# Patient Record
Sex: FEMALE | Race: WHITE | NOT HISPANIC OR LATINO | Employment: PART TIME | URBAN - METROPOLITAN AREA
[De-identification: names, ages, dates, MRNs, and addresses within clinical notes are randomized per-mention and may not be internally consistent; named-entity substitution may affect disease eponyms.]

---

## 2023-10-13 ENCOUNTER — OFFICE VISIT (OUTPATIENT)
Dept: FAMILY MEDICINE CLINIC | Facility: CLINIC | Age: 23
End: 2023-10-13
Payer: OTHER GOVERNMENT

## 2023-10-13 VITALS
WEIGHT: 148 LBS | DIASTOLIC BLOOD PRESSURE: 78 MMHG | HEART RATE: 77 BPM | BODY MASS INDEX: 27.23 KG/M2 | SYSTOLIC BLOOD PRESSURE: 118 MMHG | HEIGHT: 62 IN | OXYGEN SATURATION: 99 %

## 2023-10-13 DIAGNOSIS — Z11.59 ENCOUNTER FOR HEPATITIS C SCREENING TEST FOR LOW RISK PATIENT: ICD-10-CM

## 2023-10-13 DIAGNOSIS — Z00.00 ANNUAL PHYSICAL EXAM: Primary | ICD-10-CM

## 2023-10-13 DIAGNOSIS — Z23 NEED FOR INFLUENZA VACCINATION: ICD-10-CM

## 2023-10-13 DIAGNOSIS — Z13.220 LIPID SCREENING: ICD-10-CM

## 2023-10-13 DIAGNOSIS — F41.9 ANXIETY: ICD-10-CM

## 2023-10-13 PROCEDURE — 99385 PREV VISIT NEW AGE 18-39: CPT | Performed by: NURSE PRACTITIONER

## 2023-10-13 RX ORDER — NORGESTREL AND ETHINYL ESTRADIOL 0.3-0.03MG
1 KIT ORAL DAILY
COMMUNITY
Start: 2023-08-21

## 2023-10-13 RX ORDER — BUSPIRONE HYDROCHLORIDE 5 MG/1
5 TABLET ORAL 3 TIMES DAILY
Qty: 90 TABLET | Refills: 5 | Status: SHIPPED | OUTPATIENT
Start: 2023-10-13

## 2023-10-13 NOTE — PROGRESS NOTES
"Chief Complaint  Establish Care and Anxiety    Subjective        Miesha Bro presents to [facility] PRIMARY CARE  History of Present Illness  Miesha is experiencing anxiety.  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    Objective   Vital Signs:  /78   Pulse 77   Ht 157.5 cm (62\")   Wt 67.1 kg (148 lb)   SpO2 99%   BMI 27.07 kg/mý   Estimated body mass index is 27.07 kg/mý as calculated from the following:    Height as of this encounter: 157.5 cm (62\").    Weight as of this encounter: 67.1 kg (148 lb).       BMI is >= 25 and <30. (Overweight) The following options were offered after discussion;: weight loss educational material (shared in after visit summary), exercise counseling/recommendations, and nutrition counseling/recommendations      Physical Exam  Constitutional:       Appearance: She is normal weight.   HENT:      Head: Normocephalic and atraumatic.      Mouth/Throat:      Mouth: Mucous membranes are moist.   Eyes:      Extraocular Movements: Extraocular movements intact.      Pupils: Pupils are equal, round, and reactive to light.   Cardiovascular:      Rate and Rhythm: Normal rate and regular rhythm.      Pulses: Normal pulses.      Heart sounds: Normal heart sounds.   Pulmonary:      Effort: Pulmonary effort is normal.      Breath sounds: Normal breath sounds.   Abdominal:      General: Abdomen is flat. Bowel sounds are normal.      Palpations: Abdomen is soft.   Musculoskeletal:         General: Normal range of motion.      Cervical back: Normal range of motion.   Skin:     General: Skin is warm and dry.      Capillary Refill: Capillary refill takes less than 2 seconds.   Neurological:      General: No focal deficit present.      Mental Status: She is alert.      Comments: CN II-XII grossly " intact. No scoliosis on exam.  Miesha can heel walk, toe walk, heel-toe walk, squat and duck walk without difficulty.  Upper and lower pulses are 2+, strength is equal bilaterally both upper and lower extremities.   Psychiatric:         Mood and Affect: Mood normal.                         Assessment and Plan   There are no diagnoses linked to this encounter.         Follow Up   No follow-ups on file.  Patient was given instructions and counseling regarding her condition or for health maintenance advice. Please see specific information pulled into the AVS if appropriate.

## 2023-10-14 LAB
ALBUMIN SERPL-MCNC: 4.6 G/DL (ref 4–5)
ALBUMIN/GLOB SERPL: 1.6 {RATIO} (ref 1.2–2.2)
ALP SERPL-CCNC: 70 IU/L (ref 44–121)
ALT SERPL-CCNC: 13 IU/L (ref 0–32)
AST SERPL-CCNC: 16 IU/L (ref 0–40)
BASOPHILS # BLD AUTO: 0.1 X10E3/UL (ref 0–0.2)
BASOPHILS NFR BLD AUTO: 1 %
BILIRUB SERPL-MCNC: <0.2 MG/DL (ref 0–1.2)
BUN SERPL-MCNC: 17 MG/DL (ref 6–20)
BUN/CREAT SERPL: 24 (ref 9–23)
CALCIUM SERPL-MCNC: 9.8 MG/DL (ref 8.7–10.2)
CHLORIDE SERPL-SCNC: 102 MMOL/L (ref 96–106)
CHOLEST SERPL-MCNC: 248 MG/DL (ref 100–199)
CO2 SERPL-SCNC: 22 MMOL/L (ref 20–29)
CREAT SERPL-MCNC: 0.72 MG/DL (ref 0.57–1)
EGFRCR SERPLBLD CKD-EPI 2021: 122 ML/MIN/1.73
EOSINOPHIL # BLD AUTO: 0.1 X10E3/UL (ref 0–0.4)
EOSINOPHIL NFR BLD AUTO: 2 %
ERYTHROCYTE [DISTWIDTH] IN BLOOD BY AUTOMATED COUNT: 14.1 % (ref 11.7–15.4)
GLOBULIN SER CALC-MCNC: 2.8 G/DL (ref 1.5–4.5)
GLUCOSE SERPL-MCNC: 81 MG/DL (ref 70–99)
HCT VFR BLD AUTO: 42.2 % (ref 34–46.6)
HCV AB SERPL QL IA: NORMAL
HCV IGG SERPL QL IA: NON REACTIVE
HDLC SERPL-MCNC: 44 MG/DL
HGB BLD-MCNC: 12.9 G/DL (ref 11.1–15.9)
IMM GRANULOCYTES # BLD AUTO: 0 X10E3/UL (ref 0–0.1)
IMM GRANULOCYTES NFR BLD AUTO: 0 %
LDLC SERPL CALC-MCNC: 184 MG/DL (ref 0–99)
LYMPHOCYTES # BLD AUTO: 1.6 X10E3/UL (ref 0.7–3.1)
LYMPHOCYTES NFR BLD AUTO: 21 %
MCH RBC QN AUTO: 25.7 PG (ref 26.6–33)
MCHC RBC AUTO-ENTMCNC: 30.6 G/DL (ref 31.5–35.7)
MCV RBC AUTO: 84 FL (ref 79–97)
MONOCYTES # BLD AUTO: 0.5 X10E3/UL (ref 0.1–0.9)
MONOCYTES NFR BLD AUTO: 7 %
NEUTROPHILS # BLD AUTO: 5.3 X10E3/UL (ref 1.4–7)
NEUTROPHILS NFR BLD AUTO: 69 %
PLATELET # BLD AUTO: 303 X10E3/UL (ref 150–450)
POTASSIUM SERPL-SCNC: 4.7 MMOL/L (ref 3.5–5.2)
PROT SERPL-MCNC: 7.4 G/DL (ref 6–8.5)
RBC # BLD AUTO: 5.01 X10E6/UL (ref 3.77–5.28)
SODIUM SERPL-SCNC: 139 MMOL/L (ref 134–144)
TRIGL SERPL-MCNC: 111 MG/DL (ref 0–149)
VLDLC SERPL CALC-MCNC: 20 MG/DL (ref 5–40)
WBC # BLD AUTO: 7.7 X10E3/UL (ref 3.4–10.8)

## 2023-10-14 NOTE — PROGRESS NOTES
Miesha,    Your CBC looks fine  Your kidney and liver function look fine  Your Bun to Creatinine ratio was a little high - you need a little hydration  Your Total cholesterol and LDL cholesterol are high - Eating a healthy, low fat diet, increasing your intake of fresh fruits and vegetables, limiting beef to once a week, and adding more fish (baked preferrably) along with exercising 45 minutes 5 days a week will help improve these numbers.  You have not been exposed to Hepatits C.  Overall, work on your diet, exercise and drinking enough fluids to remain hydrated.  We will see you at your next visit.

## 2024-04-01 LAB
AFP INTERP SERPL-IMP: NORMAL
EXTERNAL NIPT: NORMAL

## 2024-06-03 ENCOUNTER — TELEPHONE (OUTPATIENT)
Dept: FAMILY MEDICINE CLINIC | Facility: CLINIC | Age: 24
End: 2024-06-03
Payer: OTHER GOVERNMENT

## 2024-06-03 NOTE — TELEPHONE ENCOUNTER
Caller: Adali Miesha    Relationship: Self    Best call back number: [phone]    What is the best time to reach you: ANYTIME     Who are you requesting to speak with (clinical staff, provider,  specific staff member): CLINICAL     What was the call regarding: PATIENT STATES WITH HER  INSURANCE, SHE IS NEEDING A REFERRAL FOR GLOBAL MATERNITY SO HER OBGYN CAN COVER EVERYTHING.     PATIENT STATES SHE GOES TO [facility] ON [address] .     PATIENT IS REQUESTING A CALL BACK.

## 2024-06-05 ENCOUNTER — TELEPHONE (OUTPATIENT)
Dept: FAMILY MEDICINE CLINIC | Facility: CLINIC | Age: 24
End: 2024-06-05
Payer: OTHER GOVERNMENT

## 2024-06-05 DIAGNOSIS — Z3A.01 LESS THAN 8 WEEKS GESTATION OF PREGNANCY: Primary | ICD-10-CM

## 2024-06-05 NOTE — TELEPHONE ENCOUNTER
Caller: Miesha Bro    Relationship: Self    Best call back number: [phone]     What is the best time to reach you: ANY    Who are you requesting to speak with (clinical staff, provider,  specific staff member): ANY    What was the call regarding: PATIENT IS REQUESTING A STATUS UPDATE ON REFERRAL FOR GLOBAL MATERNITY     Is it okay if the provider responds through MyChart: NO

## 2024-06-05 NOTE — TELEPHONE ENCOUNTER
Per Charity,    We have sent a message to our  to see what needs to be done here. Waiting to hear back from her.

## 2024-06-06 NOTE — TELEPHONE ENCOUNTER
[phone] Faxed to [facility] for referral auth requested by patient     Once approved/response received patient will be called to advise

## 2024-07-24 ENCOUNTER — INITIAL PRENATAL (OUTPATIENT)
Dept: OBSTETRICS AND GYNECOLOGY | Age: 24
End: 2024-07-24
Payer: OTHER GOVERNMENT

## 2024-07-24 VITALS — SYSTOLIC BLOOD PRESSURE: 118 MMHG | BODY MASS INDEX: 30.29 KG/M2 | WEIGHT: 165.6 LBS | DIASTOLIC BLOOD PRESSURE: 74 MMHG

## 2024-07-24 DIAGNOSIS — Z34.82 ENCOUNTER FOR SUPERVISION OF OTHER NORMAL PREGNANCY IN SECOND TRIMESTER: Primary | ICD-10-CM

## 2024-07-24 DIAGNOSIS — Z13.0 SCREENING FOR IRON DEFICIENCY ANEMIA: ICD-10-CM

## 2024-07-24 DIAGNOSIS — Z13.1 SCREENING FOR DIABETES MELLITUS: ICD-10-CM

## 2024-07-24 DIAGNOSIS — Z13.89 SCREENING FOR BLOOD OR PROTEIN IN URINE: ICD-10-CM

## 2024-07-24 PROBLEM — Z34.90 SUPERVISION OF NORMAL PREGNANCY: Status: ACTIVE | Noted: 2024-07-24

## 2024-07-24 LAB
GLUCOSE UR STRIP-MCNC: NEGATIVE MG/DL
PROT UR STRIP-MCNC: NEGATIVE MG/DL

## 2024-07-24 RX ORDER — PRENATAL VIT/IRON FUM/FOLIC AC 27MG-0.8MG
TABLET ORAL DAILY
COMMUNITY

## 2024-07-24 RX ORDER — ONDANSETRON 4 MG/1
TABLET, ORALLY DISINTEGRATING ORAL
COMMUNITY
Start: 2024-04-29

## 2024-07-24 NOTE — PROGRESS NOTES
Miesha Bro, a 23 y.o.  at 28w0d, presents for OB transfer, [facility] not [facility] her [facility]..  She is doing well today.  Denies loss of fluid, vaginal bleeding, and contractions.  Endorses fetal movements.  She has had history of 1 HSV outbreak and none further.  Her  Myron is in the [employer] and currently stationed in [place].  He does plan to be able to travel for her delivery and stay for about a month.  She is interested in induction of labor to better coordinate his travel.  She is accompanied by her mom today    Past Medical History:   Diagnosis Date    Herpes      Past Surgical History:   Procedure Laterality Date    WISDOM TOOTH EXTRACTION       Family History   Problem Relation Age of Onset    Hypertension Father     Coronary artery disease Maternal Grandfather     Deep vein thrombosis Maternal Aunt     Congenital heart disease Neg Hx        Objective  BP Readings from Last 3 Encounters:   24 118/74   10/13/23 118/78      Wt Readings from Last 3 Encounters:   24 75.1 kg (165 lb 9.6 oz)   10/13/23 67.1 kg (148 lb)      Total weight gain this pregnancy: 4.808 kg (10 lb 9.6 oz)    General: Awake, alert, no apparent distress  Respiratory: No increased work of breathing  Abdomen: Fundus soft and nontender  Extremity: Nontender, no edema    A/P  Diagnoses and all orders for this visit:    1. Encounter for supervision of other normal pregnancy in second trimester (Primary)  -     Treponema pallidum AB w/Reflex RPR    2. Screening for iron deficiency anemia  -     CBC & Differential    3. Screening for diabetes mellitus  -     Gestational Screen 1 Hr ([facility])    4. Screening for blood or protein in urine  -     POC Urinalysis Dipstick    Other orders  -     Tdap Vaccine Greater Than or Equal To 6yo IM      [facility]  Pregnancy, classes, pain mgmt counseling  GCT, tdap CBC and varicella today    I spent 45 minutes caring for  Miesha Bro on this date of service. This time includes time spent by me in the following activities: preparing for the visit, reviewing tests, obtaining and/or reviewing a separately obtained history, performing a medically appropriate examination and/or evaluation, counseling and educating the patient/family/caregiver, ordering medications, tests, or procedures and documenting information in the medical record.  This time does NOT include time spent on separately reported services.       Labor precautions reviewed  Return for every 2 wks til 36 then weekly. growth US at 34 weeks please.    Paige Bernard MD

## 2024-07-26 LAB
BASOPHILS # BLD AUTO: 0 X10E3/UL (ref 0–0.2)
BASOPHILS NFR BLD AUTO: 0 %
EOSINOPHIL # BLD AUTO: 0 X10E3/UL (ref 0–0.4)
EOSINOPHIL NFR BLD AUTO: 0 %
ERYTHROCYTE [DISTWIDTH] IN BLOOD BY AUTOMATED COUNT: 12.2 % (ref 11.7–15.4)
GLUCOSE 1H P 50 G GLC PO SERPL-MCNC: 94 MG/DL (ref 70–139)
HCT VFR BLD AUTO: 34.3 % (ref 34–46.6)
HGB BLD-MCNC: 10.9 G/DL (ref 11.1–15.9)
IMM GRANULOCYTES # BLD AUTO: 0.1 X10E3/UL (ref 0–0.1)
IMM GRANULOCYTES NFR BLD AUTO: 1 %
LYMPHOCYTES # BLD AUTO: 1.1 X10E3/UL (ref 0.7–3.1)
LYMPHOCYTES NFR BLD AUTO: 10 %
MCH RBC QN AUTO: 27.3 PG (ref 26.6–33)
MCHC RBC AUTO-ENTMCNC: 31.8 G/DL (ref 31.5–35.7)
MCV RBC AUTO: 86 FL (ref 79–97)
MONOCYTES # BLD AUTO: 0.5 X10E3/UL (ref 0.1–0.9)
MONOCYTES NFR BLD AUTO: 4 %
NEUTROPHILS # BLD AUTO: 9.9 X10E3/UL (ref 1.4–7)
NEUTROPHILS NFR BLD AUTO: 85 %
PLATELET # BLD AUTO: 232 X10E3/UL (ref 150–450)
RBC # BLD AUTO: 4 X10E6/UL (ref 3.77–5.28)
TREPONEMA PALLIDUM IGG+IGM AB [PRESENCE] IN SERUM OR PLASMA BY IMMUNOASSAY: NON REACTIVE
WBC # BLD AUTO: 11.7 X10E3/UL (ref 3.4–10.8)

## 2024-08-07 ENCOUNTER — ROUTINE PRENATAL (OUTPATIENT)
Dept: OBSTETRICS AND GYNECOLOGY | Age: 24
End: 2024-08-07
Payer: OTHER GOVERNMENT

## 2024-08-07 VITALS — SYSTOLIC BLOOD PRESSURE: 124 MMHG | BODY MASS INDEX: 31.31 KG/M2 | WEIGHT: 171.2 LBS | DIASTOLIC BLOOD PRESSURE: 76 MMHG

## 2024-08-07 DIAGNOSIS — Z34.03 ENCOUNTER FOR SUPERVISION OF NORMAL FIRST PREGNANCY IN THIRD TRIMESTER: ICD-10-CM

## 2024-08-07 DIAGNOSIS — Z13.89 SCREENING FOR BLOOD OR PROTEIN IN URINE: Primary | ICD-10-CM

## 2024-08-07 LAB
GLUCOSE UR STRIP-MCNC: NEGATIVE MG/DL
PROT UR STRIP-MCNC: NEGATIVE MG/DL

## 2024-08-07 NOTE — PROGRESS NOTES
Miesha Bro, a 24 y.o.  at 30w0d, presents for OB follow-up.  She is doing well today.  Denies loss of fluid, vaginal bleeding, and contractions.  Endorses fetal movements.    Objective  BP Readings from Last 3 Encounters:   24 124/76   24 118/74   10/13/23 118/78      Wt Readings from Last 3 Encounters:   24 77.7 kg (171 lb 3.2 oz)   24 75.1 kg (165 lb 9.6 oz)   10/13/23 67.1 kg (148 lb)      Total weight gain this pregnancy: 7.348 kg (16 lb 3.2 oz)    General: Awake, alert, no apparent distress  Respiratory: No increased work of breathing  Abdomen: Fundus soft and nontender  Extremity: Nontender, no edema    A/P  Diagnoses and all orders for this visit:    1. Screening for blood or protein in urine (Primary)  -     POC Urinalysis Dipstick    2. Encounter for supervision of normal first pregnancy in third trimester    Mild anemia, PNV doesn't contain iron and advised to start iron  Labor precautions reviewed  FU 2 wks    Paige Bernard MD

## 2024-08-21 ENCOUNTER — ROUTINE PRENATAL (OUTPATIENT)
Dept: OBSTETRICS AND GYNECOLOGY | Age: 24
End: 2024-08-21
Payer: OTHER GOVERNMENT

## 2024-08-21 VITALS — BODY MASS INDEX: 31.72 KG/M2 | SYSTOLIC BLOOD PRESSURE: 120 MMHG | DIASTOLIC BLOOD PRESSURE: 74 MMHG | WEIGHT: 173.4 LBS

## 2024-08-21 DIAGNOSIS — Z34.03 ENCOUNTER FOR SUPERVISION OF NORMAL FIRST PREGNANCY IN THIRD TRIMESTER: ICD-10-CM

## 2024-08-21 DIAGNOSIS — Z13.89 SCREENING FOR BLOOD OR PROTEIN IN URINE: Primary | ICD-10-CM

## 2024-08-21 NOTE — PROGRESS NOTES
Miesha Bro, a 24 y.o.  at 32w0d, presents for OB follow-up.  She is doing well today.  Denies loss of fluid, vaginal bleeding, and contractions.  Endorses fetal movements.    Objective  BP Readings from Last 3 Encounters:   24 120/74   24 124/76   24 118/74      Wt Readings from Last 3 Encounters:   24 78.7 kg (173 lb 6.4 oz)   24 77.7 kg (171 lb 3.2 oz)   24 75.1 kg (165 lb 9.6 oz)      Total weight gain this pregnancy: 8.346 kg (18 lb 6.4 oz)    General: Awake, alert, no apparent distress  Respiratory: No increased work of breathing  Abdomen: Fundus soft and nontender  Extremity: Nontender, no edema    A/P  Diagnoses and all orders for this visit:    1. Screening for blood or protein in urine (Primary)  -     POC Urinalysis Dipstick    2. Encounter for supervision of normal first pregnancy in third trimester    Other orders  -     ABRYSVO Vaccine (RSV for Pregnant Women 32-36 wks)        Labor precautions reviewed  FU 2 wks    Paige Bernard MD

## 2024-09-04 ENCOUNTER — ROUTINE PRENATAL (OUTPATIENT)
Dept: OBSTETRICS AND GYNECOLOGY | Age: 24
End: 2024-09-04
Payer: OTHER GOVERNMENT

## 2024-09-04 VITALS — WEIGHT: 176.2 LBS | DIASTOLIC BLOOD PRESSURE: 76 MMHG | SYSTOLIC BLOOD PRESSURE: 118 MMHG | BODY MASS INDEX: 32.23 KG/M2

## 2024-09-04 DIAGNOSIS — Z13.89 SCREENING FOR BLOOD OR PROTEIN IN URINE: Primary | ICD-10-CM

## 2024-09-04 DIAGNOSIS — Z34.03 ENCOUNTER FOR SUPERVISION OF NORMAL FIRST PREGNANCY IN THIRD TRIMESTER: ICD-10-CM

## 2024-09-04 LAB
GLUCOSE UR STRIP-MCNC: NEGATIVE MG/DL
PROT UR STRIP-MCNC: NEGATIVE MG/DL

## 2024-09-04 NOTE — PROGRESS NOTES
Miesha Bro, a 24 y.o.  at 34w0d, presents for OB follow-up.  She is doing well today.  Denies loss of fluid, vaginal bleeding. She has been having some intermittent contractions it sounds like. Discussed PTL precautions.  Endorses fetal movements.    Objective  BP Readings from Last 3 Encounters:   24 118/76   24 120/74   24 124/76      Wt Readings from Last 3 Encounters:   24 79.9 kg (176 lb 3.2 oz)   24 78.7 kg (173 lb 6.4 oz)   24 77.7 kg (171 lb 3.2 oz)      Total weight gain this pregnancy: 9.616 kg (21 lb 3.2 oz)    General: Awake, alert, no apparent distress  Respiratory: No increased work of breathing  Abdomen: Fundus soft and nontender  Extremity: Nontender, no edema    A/P  Diagnoses and all orders for this visit:    1. Screening for blood or protein in urine (Primary)  -     POC Urinalysis Dipstick    2. Encounter for supervision of normal first pregnancy in third trimester      No, average fetal growth  Vertex presentation    Follow-up in 2 weeks with GBS  Labor precautions reviewed      Paige Bernard MD

## 2024-09-19 ENCOUNTER — ROUTINE PRENATAL (OUTPATIENT)
Dept: OBSTETRICS AND GYNECOLOGY | Age: 24
End: 2024-09-19
Payer: OTHER GOVERNMENT

## 2024-09-19 VITALS — DIASTOLIC BLOOD PRESSURE: 70 MMHG | SYSTOLIC BLOOD PRESSURE: 120 MMHG | WEIGHT: 179 LBS | BODY MASS INDEX: 32.74 KG/M2

## 2024-09-19 DIAGNOSIS — B00.9 HERPES VIRUS INFECTION IN MOTHER DURING THIRD TRIMESTER OF PREGNANCY: ICD-10-CM

## 2024-09-19 DIAGNOSIS — Z36.85 ANTENATAL SCREENING FOR STREPTOCOCCUS B: ICD-10-CM

## 2024-09-19 DIAGNOSIS — Z13.89 SCREENING FOR BLOOD OR PROTEIN IN URINE: Primary | ICD-10-CM

## 2024-09-19 DIAGNOSIS — O98.513 HERPES VIRUS INFECTION IN MOTHER DURING THIRD TRIMESTER OF PREGNANCY: ICD-10-CM

## 2024-09-19 LAB
GLUCOSE UR STRIP-MCNC: NEGATIVE MG/DL
PROT UR STRIP-MCNC: NEGATIVE MG/DL

## 2024-09-19 RX ORDER — VALACYCLOVIR HYDROCHLORIDE 500 MG/1
500 TABLET, FILM COATED ORAL 2 TIMES DAILY
Qty: 60 TABLET | Refills: 1 | Status: SHIPPED | OUTPATIENT
Start: 2024-09-19

## 2024-09-23 LAB — B-HEM STREP SPEC QL CULT: NEGATIVE

## 2024-09-25 ENCOUNTER — ROUTINE PRENATAL (OUTPATIENT)
Dept: OBSTETRICS AND GYNECOLOGY | Age: 24
End: 2024-09-25
Payer: OTHER GOVERNMENT

## 2024-09-25 VITALS — WEIGHT: 181 LBS | BODY MASS INDEX: 33.11 KG/M2 | DIASTOLIC BLOOD PRESSURE: 70 MMHG | SYSTOLIC BLOOD PRESSURE: 118 MMHG

## 2024-09-25 DIAGNOSIS — Z34.03 ENCOUNTER FOR SUPERVISION OF NORMAL FIRST PREGNANCY IN THIRD TRIMESTER: ICD-10-CM

## 2024-09-25 DIAGNOSIS — Z13.89 SCREENING FOR BLOOD OR PROTEIN IN URINE: Primary | ICD-10-CM

## 2024-09-25 LAB
GLUCOSE UR STRIP-MCNC: NEGATIVE MG/DL
PROT UR STRIP-MCNC: NEGATIVE MG/DL

## 2024-09-25 PROCEDURE — 0502F SUBSEQUENT PRENATAL CARE: CPT | Performed by: OBSTETRICS & GYNECOLOGY

## 2024-10-02 ENCOUNTER — ROUTINE PRENATAL (OUTPATIENT)
Dept: OBSTETRICS AND GYNECOLOGY | Age: 24
End: 2024-10-02
Payer: OTHER GOVERNMENT

## 2024-10-02 ENCOUNTER — PREP FOR SURGERY (OUTPATIENT)
Dept: OTHER | Facility: HOSPITAL | Age: 24
End: 2024-10-02
Payer: OTHER GOVERNMENT

## 2024-10-02 VITALS — WEIGHT: 180.8 LBS | BODY MASS INDEX: 33.07 KG/M2 | SYSTOLIC BLOOD PRESSURE: 128 MMHG | DIASTOLIC BLOOD PRESSURE: 80 MMHG

## 2024-10-02 DIAGNOSIS — Z34.03 ENCOUNTER FOR SUPERVISION OF NORMAL FIRST PREGNANCY IN THIRD TRIMESTER: ICD-10-CM

## 2024-10-02 DIAGNOSIS — Z13.89 SCREENING FOR BLOOD OR PROTEIN IN URINE: Primary | ICD-10-CM

## 2024-10-02 LAB
GLUCOSE UR STRIP-MCNC: NEGATIVE MG/DL
PROT UR STRIP-MCNC: ABNORMAL MG/DL

## 2024-10-02 RX ORDER — LIDOCAINE HYDROCHLORIDE 10 MG/ML
0.5 INJECTION, SOLUTION INFILTRATION; PERINEURAL ONCE AS NEEDED
OUTPATIENT
Start: 2024-10-02

## 2024-10-02 RX ORDER — TERBUTALINE SULFATE 1 MG/ML
0.25 INJECTION, SOLUTION SUBCUTANEOUS AS NEEDED
OUTPATIENT
Start: 2024-10-02

## 2024-10-02 RX ORDER — ACETAMINOPHEN 325 MG/1
650 TABLET ORAL EVERY 4 HOURS PRN
OUTPATIENT
Start: 2024-10-02

## 2024-10-02 RX ORDER — ONDANSETRON 4 MG/1
4 TABLET, ORALLY DISINTEGRATING ORAL EVERY 6 HOURS PRN
OUTPATIENT
Start: 2024-10-02

## 2024-10-02 RX ORDER — ONDANSETRON 2 MG/ML
4 INJECTION INTRAMUSCULAR; INTRAVENOUS EVERY 6 HOURS PRN
OUTPATIENT
Start: 2024-10-02

## 2024-10-02 RX ORDER — SODIUM CHLORIDE 9 MG/ML
40 INJECTION, SOLUTION INTRAVENOUS AS NEEDED
OUTPATIENT
Start: 2024-10-02

## 2024-10-02 RX ORDER — FAMOTIDINE 20 MG/1
20 TABLET, FILM COATED ORAL 2 TIMES DAILY PRN
OUTPATIENT
Start: 2024-10-02

## 2024-10-02 RX ORDER — SODIUM CHLORIDE 0.9 % (FLUSH) 0.9 %
10 SYRINGE (ML) INJECTION EVERY 12 HOURS SCHEDULED
OUTPATIENT
Start: 2024-10-02

## 2024-10-02 RX ORDER — SODIUM CHLORIDE, SODIUM LACTATE, POTASSIUM CHLORIDE, CALCIUM CHLORIDE 600; 310; 30; 20 MG/100ML; MG/100ML; MG/100ML; MG/100ML
125 INJECTION, SOLUTION INTRAVENOUS CONTINUOUS
OUTPATIENT
Start: 2024-10-02

## 2024-10-02 RX ORDER — CARBOPROST TROMETHAMINE 250 UG/ML
250 INJECTION, SOLUTION INTRAMUSCULAR
OUTPATIENT
Start: 2024-10-02

## 2024-10-02 RX ORDER — FAMOTIDINE 10 MG/ML
20 INJECTION, SOLUTION INTRAVENOUS 2 TIMES DAILY PRN
OUTPATIENT
Start: 2024-10-02

## 2024-10-02 RX ORDER — MISOPROSTOL 200 UG/1
800 TABLET ORAL ONCE AS NEEDED
OUTPATIENT
Start: 2024-10-02

## 2024-10-02 RX ORDER — TRANEXAMIC ACID 10 MG/ML
1000 INJECTION, SOLUTION INTRAVENOUS ONCE AS NEEDED
OUTPATIENT
Start: 2024-10-02

## 2024-10-02 RX ORDER — SODIUM CHLORIDE 0.9 % (FLUSH) 0.9 %
10 SYRINGE (ML) INJECTION AS NEEDED
OUTPATIENT
Start: 2024-10-02

## 2024-10-02 RX ORDER — METHYLERGONOVINE MALEATE 0.2 MG/ML
200 INJECTION INTRAVENOUS ONCE AS NEEDED
OUTPATIENT
Start: 2024-10-02

## 2024-10-02 RX ORDER — OXYTOCIN/0.9 % SODIUM CHLORIDE 30/500 ML
999 PLASTIC BAG, INJECTION (ML) INTRAVENOUS ONCE
OUTPATIENT
Start: 2024-10-02 | End: 2024-10-02

## 2024-10-02 RX ORDER — OXYTOCIN/0.9 % SODIUM CHLORIDE 30/500 ML
250 PLASTIC BAG, INJECTION (ML) INTRAVENOUS CONTINUOUS
OUTPATIENT
Start: 2024-10-02 | End: 2024-10-02

## 2024-10-02 RX ORDER — MAGNESIUM CARB/ALUMINUM HYDROX 105-160MG
30 TABLET,CHEWABLE ORAL ONCE AS NEEDED
OUTPATIENT
Start: 2024-10-02

## 2024-10-02 NOTE — PROGRESS NOTES
Miesha Bro, a 24 y.o.  at 38w0d, presents for OB follow-up.  She is doing well today.  Denies loss of fluid, vaginal bleeding, and contractions.  Endorses fetal movements.    Objective  BP Readings from Last 3 Encounters:   10/02/24 128/80   24 118/70   24 120/70      Wt Readings from Last 3 Encounters:   10/02/24 82 kg (180 lb 12.8 oz)   24 82.1 kg (181 lb)   24 81.2 kg (179 lb)      Total weight gain this pregnancy: 11.7 kg (25 lb 12.8 oz)    General: Awake, alert, no apparent distress  Respiratory: No increased work of breathing  Abdomen: Fundus soft and nontender  Extremity: Nontender, no edema    A/P  Diagnoses and all orders for this visit:    1. Screening for blood or protein in urine (Primary)  -     POC Urinalysis Dipstick    2. Encounter for supervision of normal first pregnancy in third trimester  -     Labor Induction      Desires term IOL, scheduled today    Paige Bernard MD

## 2024-10-03 ENCOUNTER — PREP FOR SURGERY (OUTPATIENT)
Dept: OTHER | Facility: HOSPITAL | Age: 24
End: 2024-10-03
Payer: OTHER GOVERNMENT

## 2024-10-03 RX ORDER — MISOPROSTOL 100 MCG
25 TABLET ORAL EVERY 4 HOURS PRN
OUTPATIENT
Start: 2024-10-03

## 2024-10-10 ENCOUNTER — ANESTHESIA EVENT (OUTPATIENT)
Dept: LABOR AND DELIVERY | Facility: HOSPITAL | Age: 24
End: 2024-10-10
Payer: OTHER GOVERNMENT

## 2024-10-10 ENCOUNTER — HOSPITAL ENCOUNTER (OUTPATIENT)
Dept: LABOR AND DELIVERY | Facility: HOSPITAL | Age: 24
Discharge: HOME | End: 2024-10-10
Payer: OTHER GOVERNMENT

## 2024-10-10 ENCOUNTER — ANESTHESIA (OUTPATIENT)
Dept: LABOR AND DELIVERY | Facility: HOSPITAL | Age: 24
End: 2024-10-10
Payer: OTHER GOVERNMENT

## 2024-10-10 ENCOUNTER — HOSPITAL ENCOUNTER (INPATIENT)
Facility: HOSPITAL | Age: 24
LOS: 3 days | Discharge: HOME | End: 2024-10-13
Attending: OBSTETRICS & GYNECOLOGY | Admitting: OBSTETRICS & GYNECOLOGY
Payer: OTHER GOVERNMENT

## 2024-10-10 PROBLEM — Z37.9 VACUUM-ASSISTED VAGINAL DELIVERY: Status: ACTIVE | Noted: 2024-10-10

## 2024-10-10 PROBLEM — O14.13 SEVERE PRE-ECLAMPSIA IN THIRD TRIMESTER: Status: ACTIVE | Noted: 2024-10-10

## 2024-10-10 LAB
ABO GROUP BLD: NORMAL
ALBUMIN SERPL-MCNC: 3.4 G/DL (ref 3.5–5.2)
ALBUMIN/GLOB SERPL: 1.3 G/DL
ALP SERPL-CCNC: 90 U/L (ref 39–117)
ALT SERPL W P-5'-P-CCNC: 17 U/L (ref 1–33)
ANION GAP SERPL CALCULATED.3IONS-SCNC: 10.9 MMOL/L (ref 5–15)
AST SERPL-CCNC: 20 U/L (ref 1–32)
ATMOSPHERIC PRESS: 753.1 MMHG
ATMOSPHERIC PRESS: 753.5 MMHG
BASE EXCESS BLDCOA CALC-SCNC: -5.7 MMOL/L (ref -2–2)
BASE EXCESS BLDCOV CALC-SCNC: -4.5 MMOL/L (ref -30–30)
BASOPHILS # BLD AUTO: 0.05 10*3/MM3 (ref 0–0.2)
BASOPHILS NFR BLD AUTO: 0.5 % (ref 0–1.5)
BDY SITE: ABNORMAL
BDY SITE: NORMAL
BILIRUB SERPL-MCNC: <0.2 MG/DL (ref 0–1.2)
BLD GP AB SCN SERPL QL: NEGATIVE
BUN SERPL-MCNC: 10 MG/DL (ref 6–20)
BUN/CREAT SERPL: 20.8 (ref 7–25)
CALCIUM SPEC-SCNC: 8.8 MG/DL (ref 8.6–10.5)
CHLORIDE SERPL-SCNC: 105 MMOL/L (ref 98–107)
CO2 BLDA-SCNC: 23.8 MMOL/L (ref 23–27)
CO2 BLDA-SCNC: 24.4 MMOL/L (ref 23–27)
CO2 SERPL-SCNC: 20.1 MMOL/L (ref 22–29)
CREAT SERPL-MCNC: 0.48 MG/DL (ref 0.57–1)
DEPRECATED RDW RBC AUTO: 53.7 FL (ref 37–54)
DEVICE COMMENT: ABNORMAL
DEVICE COMMENT: NORMAL
EGFRCR SERPLBLD CKD-EPI 2021: 137.5 ML/MIN/1.73
EOSINOPHIL # BLD AUTO: 0.09 10*3/MM3 (ref 0–0.4)
EOSINOPHIL NFR BLD AUTO: 0.8 % (ref 0.3–6.2)
ERYTHROCYTE [DISTWIDTH] IN BLOOD BY AUTOMATED COUNT: 17.9 % (ref 12.3–15.4)
GLOBULIN UR ELPH-MCNC: 2.7 GM/DL
GLUCOSE SERPL-MCNC: 74 MG/DL (ref 65–99)
HCO3 BLDCOA-SCNC: 22.7 MMOL/L (ref 22–28)
HCO3 BLDCOV-SCNC: 22.4 MMOL/L
HCT VFR BLD AUTO: 35.5 % (ref 34–46.6)
HGB BLD-MCNC: 11.4 G/DL (ref 12–15.9)
IMM GRANULOCYTES # BLD AUTO: 0.05 10*3/MM3 (ref 0–0.05)
IMM GRANULOCYTES NFR BLD AUTO: 0.5 % (ref 0–0.5)
LYMPHOCYTES # BLD AUTO: 1.7 10*3/MM3 (ref 0.7–3.1)
LYMPHOCYTES NFR BLD AUTO: 15.9 % (ref 19.6–45.3)
MCH RBC QN AUTO: 26.7 PG (ref 26.6–33)
MCHC RBC AUTO-ENTMCNC: 32.1 G/DL (ref 31.5–35.7)
MCV RBC AUTO: 83.1 FL (ref 79–97)
MODALITY: ABNORMAL
MODALITY: NORMAL
MONOCYTES # BLD AUTO: 0.79 10*3/MM3 (ref 0.1–0.9)
MONOCYTES NFR BLD AUTO: 7.4 % (ref 5–12)
NEUTROPHILS NFR BLD AUTO: 74.9 % (ref 42.7–76)
NEUTROPHILS NFR BLD AUTO: 8.01 10*3/MM3 (ref 1.7–7)
PCO2 BLDCOA: 56.5 MMHG (ref 43–63)
PCO2 BLDCOV: 47.6 MM HG (ref 35–51.3)
PH BLDCOA: 7.21 PH UNITS (ref 7.18–7.34)
PH BLDCOV: 7.28 PH UNITS (ref 7.26–7.4)
PLATELET # BLD AUTO: 181 10*3/MM3 (ref 140–450)
PMV BLD AUTO: 12 FL (ref 6–12)
PO2 BLDCOA: <22.1 MMHG (ref 12–26)
PO2 BLDCOV: 21.6 MM HG (ref 19–39)
POTASSIUM SERPL-SCNC: 3.7 MMOL/L (ref 3.5–5.2)
PROT SERPL-MCNC: 6.1 G/DL (ref 6–8.5)
RBC # BLD AUTO: 4.27 10*6/MM3 (ref 3.77–5.28)
RH BLD: POSITIVE
SAO2 % BLDCOA: 21.1 %
SAO2 % BLDCOV: NORMAL %
SODIUM SERPL-SCNC: 136 MMOL/L (ref 136–145)
T&S EXPIRATION DATE: NORMAL
TREPONEMA PALLIDUM IGG+IGM AB [PRESENCE] IN SERUM OR PLASMA BY IMMUNOASSAY: NORMAL
WBC NRBC COR # BLD AUTO: 10.69 10*3/MM3 (ref 3.4–10.8)

## 2024-10-10 PROCEDURE — 25010000002 LABETALOL 5 MG/ML SOLUTION: Performed by: OBSTETRICS & GYNECOLOGY

## 2024-10-10 PROCEDURE — 25010000002 MAGNESIUM SULFATE 20 GM/500ML SOLUTION: Performed by: OBSTETRICS & GYNECOLOGY

## 2024-10-10 PROCEDURE — 85025 COMPLETE CBC W/AUTO DIFF WBC: CPT | Performed by: OBSTETRICS & GYNECOLOGY

## 2024-10-10 PROCEDURE — 86850 RBC ANTIBODY SCREEN: CPT | Performed by: OBSTETRICS & GYNECOLOGY

## 2024-10-10 PROCEDURE — 25810000003 LACTATED RINGERS PER 1000 ML: Performed by: OBSTETRICS & GYNECOLOGY

## 2024-10-10 PROCEDURE — 12042 INTMD RPR N-HF/GENIT2.6-7.5: CPT | Performed by: OBSTETRICS & GYNECOLOGY

## 2024-10-10 PROCEDURE — 80053 COMPREHEN METABOLIC PANEL: CPT | Performed by: OBSTETRICS & GYNECOLOGY

## 2024-10-10 PROCEDURE — 25010000002 ROPIVACAINE PER 1 MG: Performed by: STUDENT IN AN ORGANIZED HEALTH CARE EDUCATION/TRAINING PROGRAM

## 2024-10-10 PROCEDURE — 3E033VJ INTRODUCTION OF OTHER HORMONE INTO PERIPHERAL VEIN, PERCUTANEOUS APPROACH: ICD-10-PCS | Performed by: OBSTETRICS & GYNECOLOGY

## 2024-10-10 PROCEDURE — 82803 BLOOD GASES ANY COMBINATION: CPT

## 2024-10-10 PROCEDURE — 86900 BLOOD TYPING SEROLOGIC ABO: CPT | Performed by: OBSTETRICS & GYNECOLOGY

## 2024-10-10 PROCEDURE — C1755 CATHETER, INTRASPINAL: HCPCS | Performed by: STUDENT IN AN ORGANIZED HEALTH CARE EDUCATION/TRAINING PROGRAM

## 2024-10-10 PROCEDURE — 86780 TREPONEMA PALLIDUM: CPT | Performed by: OBSTETRICS & GYNECOLOGY

## 2024-10-10 PROCEDURE — 59400 OBSTETRICAL CARE: CPT | Performed by: OBSTETRICS & GYNECOLOGY

## 2024-10-10 PROCEDURE — 86901 BLOOD TYPING SEROLOGIC RH(D): CPT | Performed by: OBSTETRICS & GYNECOLOGY

## 2024-10-10 RX ORDER — ERYTHROMYCIN 5 MG/G
OINTMENT OPHTHALMIC
Status: ACTIVE
Start: 2024-10-10 | End: 2024-10-10

## 2024-10-10 RX ORDER — FAMOTIDINE 20 MG/1
20 TABLET, FILM COATED ORAL 2 TIMES DAILY PRN
Status: DISCONTINUED | OUTPATIENT
Start: 2024-10-10 | End: 2024-10-10

## 2024-10-10 RX ORDER — FAMOTIDINE 10 MG/ML
20 INJECTION, SOLUTION INTRAVENOUS 2 TIMES DAILY PRN
Status: DISCONTINUED | OUTPATIENT
Start: 2024-10-10 | End: 2024-10-10

## 2024-10-10 RX ORDER — FENTANYL/ROPIVACAINE/NS/PF 2MCG/ML-.2
10 PLASTIC BAG, INJECTION (ML) INJECTION CONTINUOUS
Status: DISCONTINUED | OUTPATIENT
Start: 2024-10-10 | End: 2024-10-10

## 2024-10-10 RX ORDER — SODIUM CHLORIDE 0.9 % (FLUSH) 0.9 %
1-10 SYRINGE (ML) INJECTION AS NEEDED
Status: DISCONTINUED | OUTPATIENT
Start: 2024-10-10 | End: 2024-10-13 | Stop reason: HOSPADM

## 2024-10-10 RX ORDER — OXYTOCIN/0.9 % SODIUM CHLORIDE 30/500 ML
999 PLASTIC BAG, INJECTION (ML) INTRAVENOUS ONCE
Status: COMPLETED | OUTPATIENT
Start: 2024-10-10 | End: 2024-10-10

## 2024-10-10 RX ORDER — PRENATAL VIT/IRON FUM/FOLIC AC 27MG-0.8MG
1 TABLET ORAL DAILY
Status: DISCONTINUED | OUTPATIENT
Start: 2024-10-10 | End: 2024-10-13 | Stop reason: HOSPADM

## 2024-10-10 RX ORDER — SODIUM CHLORIDE, SODIUM LACTATE, POTASSIUM CHLORIDE, CALCIUM CHLORIDE 600; 310; 30; 20 MG/100ML; MG/100ML; MG/100ML; MG/100ML
125 INJECTION, SOLUTION INTRAVENOUS CONTINUOUS
Status: DISCONTINUED | OUTPATIENT
Start: 2024-10-10 | End: 2024-10-10

## 2024-10-10 RX ORDER — BUPIVACAINE HYDROCHLORIDE 2.5 MG/ML
INJECTION, SOLUTION EPIDURAL; INFILTRATION; INTRACAUDAL
Status: ACTIVE
Start: 2024-10-10 | End: 2024-10-10

## 2024-10-10 RX ORDER — ACETAMINOPHEN 325 MG/1
650 TABLET ORAL EVERY 6 HOURS PRN
Status: DISCONTINUED | OUTPATIENT
Start: 2024-10-10 | End: 2024-10-13 | Stop reason: HOSPADM

## 2024-10-10 RX ORDER — ONDANSETRON 2 MG/ML
4 INJECTION INTRAMUSCULAR; INTRAVENOUS ONCE AS NEEDED
Status: DISCONTINUED | OUTPATIENT
Start: 2024-10-10 | End: 2024-10-10

## 2024-10-10 RX ORDER — ONDANSETRON 2 MG/ML
4 INJECTION INTRAMUSCULAR; INTRAVENOUS EVERY 6 HOURS PRN
Status: DISCONTINUED | OUTPATIENT
Start: 2024-10-10 | End: 2024-10-10

## 2024-10-10 RX ORDER — ROPIVACAINE HYDROCHLORIDE 2 MG/ML
INJECTION, SOLUTION EPIDURAL; INFILTRATION; PERINEURAL AS NEEDED
Status: DISCONTINUED | OUTPATIENT
Start: 2024-10-10 | End: 2024-10-10 | Stop reason: SURG

## 2024-10-10 RX ORDER — SODIUM CHLORIDE 0.9 % (FLUSH) 0.9 %
10 SYRINGE (ML) INJECTION EVERY 12 HOURS SCHEDULED
Status: DISCONTINUED | OUTPATIENT
Start: 2024-10-10 | End: 2024-10-10

## 2024-10-10 RX ORDER — HYDROCODONE BITARTRATE AND ACETAMINOPHEN 5; 325 MG/1; MG/1
1 TABLET ORAL EVERY 4 HOURS PRN
Status: DISCONTINUED | OUTPATIENT
Start: 2024-10-10 | End: 2024-10-13 | Stop reason: HOSPADM

## 2024-10-10 RX ORDER — MISOPROSTOL 100 MCG
25 TABLET ORAL EVERY 4 HOURS PRN
Status: DISCONTINUED | OUTPATIENT
Start: 2024-10-10 | End: 2024-10-10

## 2024-10-10 RX ORDER — HYDROCORTISONE 25 MG/G
1 CREAM TOPICAL AS NEEDED
Status: DISCONTINUED | OUTPATIENT
Start: 2024-10-10 | End: 2024-10-13 | Stop reason: HOSPADM

## 2024-10-10 RX ORDER — SODIUM CHLORIDE, SODIUM LACTATE, POTASSIUM CHLORIDE, CALCIUM CHLORIDE 600; 310; 30; 20 MG/100ML; MG/100ML; MG/100ML; MG/100ML
75 INJECTION, SOLUTION INTRAVENOUS CONTINUOUS
Status: ACTIVE | OUTPATIENT
Start: 2024-10-10 | End: 2024-10-12

## 2024-10-10 RX ORDER — DIPHENHYDRAMINE HYDROCHLORIDE 50 MG/ML
12.5 INJECTION INTRAMUSCULAR; INTRAVENOUS EVERY 8 HOURS PRN
Status: DISCONTINUED | OUTPATIENT
Start: 2024-10-10 | End: 2024-10-10

## 2024-10-10 RX ORDER — SODIUM CHLORIDE, SODIUM LACTATE, POTASSIUM CHLORIDE, CALCIUM CHLORIDE 600; 310; 30; 20 MG/100ML; MG/100ML; MG/100ML; MG/100ML
75 INJECTION, SOLUTION INTRAVENOUS CONTINUOUS
Status: ACTIVE | OUTPATIENT
Start: 2024-10-10 | End: 2024-10-10

## 2024-10-10 RX ORDER — DIPHENHYDRAMINE HCL 25 MG
25 CAPSULE ORAL NIGHTLY PRN
Status: DISCONTINUED | OUTPATIENT
Start: 2024-10-10 | End: 2024-10-13 | Stop reason: HOSPADM

## 2024-10-10 RX ORDER — ACETAMINOPHEN 325 MG/1
650 TABLET ORAL EVERY 4 HOURS PRN
Status: DISCONTINUED | OUTPATIENT
Start: 2024-10-10 | End: 2024-10-10

## 2024-10-10 RX ORDER — OXYTOCIN/0.9 % SODIUM CHLORIDE 30/500 ML
250 PLASTIC BAG, INJECTION (ML) INTRAVENOUS CONTINUOUS
Status: DISPENSED | OUTPATIENT
Start: 2024-10-10 | End: 2024-10-10

## 2024-10-10 RX ORDER — FERROUS SULFATE 325(65) MG
325 TABLET ORAL
COMMUNITY

## 2024-10-10 RX ORDER — HYDROCODONE BITARTRATE AND ACETAMINOPHEN 10; 325 MG/1; MG/1
1 TABLET ORAL EVERY 4 HOURS PRN
Status: DISCONTINUED | OUTPATIENT
Start: 2024-10-10 | End: 2024-10-13 | Stop reason: HOSPADM

## 2024-10-10 RX ORDER — CARBOPROST TROMETHAMINE 250 UG/ML
250 INJECTION, SOLUTION INTRAMUSCULAR
Status: DISCONTINUED | OUTPATIENT
Start: 2024-10-10 | End: 2024-10-10

## 2024-10-10 RX ORDER — PHYTONADIONE 1 MG/.5ML
INJECTION, EMULSION INTRAMUSCULAR; INTRAVENOUS; SUBCUTANEOUS
Status: ACTIVE
Start: 2024-10-10 | End: 2024-10-10

## 2024-10-10 RX ORDER — HYDRALAZINE HYDROCHLORIDE 20 MG/ML
5-10 INJECTION INTRAMUSCULAR; INTRAVENOUS
Status: DISCONTINUED | OUTPATIENT
Start: 2024-10-10 | End: 2024-10-10

## 2024-10-10 RX ORDER — LIDOCAINE HYDROCHLORIDE 10 MG/ML
0.5 INJECTION, SOLUTION INFILTRATION; PERINEURAL ONCE AS NEEDED
Status: DISCONTINUED | OUTPATIENT
Start: 2024-10-10 | End: 2024-10-10

## 2024-10-10 RX ORDER — LABETALOL HYDROCHLORIDE 5 MG/ML
20-80 INJECTION, SOLUTION INTRAVENOUS
Status: DISCONTINUED | OUTPATIENT
Start: 2024-10-10 | End: 2024-10-10

## 2024-10-10 RX ORDER — ONDANSETRON 4 MG/1
4 TABLET, ORALLY DISINTEGRATING ORAL EVERY 8 HOURS PRN
Status: DISCONTINUED | OUTPATIENT
Start: 2024-10-10 | End: 2024-10-13 | Stop reason: HOSPADM

## 2024-10-10 RX ORDER — TRANEXAMIC ACID 10 MG/ML
1000 INJECTION, SOLUTION INTRAVENOUS ONCE AS NEEDED
Status: DISCONTINUED | OUTPATIENT
Start: 2024-10-10 | End: 2024-10-10

## 2024-10-10 RX ORDER — FAMOTIDINE 10 MG/ML
20 INJECTION, SOLUTION INTRAVENOUS ONCE AS NEEDED
Status: DISCONTINUED | OUTPATIENT
Start: 2024-10-10 | End: 2024-10-10

## 2024-10-10 RX ORDER — EPHEDRINE SULFATE 50 MG/ML
5 INJECTION, SOLUTION INTRAVENOUS
Status: DISCONTINUED | OUTPATIENT
Start: 2024-10-10 | End: 2024-10-10

## 2024-10-10 RX ORDER — SODIUM CHLORIDE 0.9 % (FLUSH) 0.9 %
10 SYRINGE (ML) INJECTION AS NEEDED
Status: DISCONTINUED | OUTPATIENT
Start: 2024-10-10 | End: 2024-10-10

## 2024-10-10 RX ORDER — PETROLATUM 420 MG/G
1 OINTMENT TOPICAL EVERY 12 HOURS SCHEDULED
Status: DISCONTINUED | OUTPATIENT
Start: 2024-10-10 | End: 2024-10-13 | Stop reason: HOSPADM

## 2024-10-10 RX ORDER — MAGNESIUM SULFATE HEPTAHYDRATE 40 MG/ML
2 INJECTION, SOLUTION INTRAVENOUS CONTINUOUS
Status: DISPENSED | OUTPATIENT
Start: 2024-10-10 | End: 2024-10-13

## 2024-10-10 RX ORDER — DOCUSATE SODIUM 100 MG/1
100 CAPSULE, LIQUID FILLED ORAL 2 TIMES DAILY
Status: DISCONTINUED | OUTPATIENT
Start: 2024-10-10 | End: 2024-10-13 | Stop reason: HOSPADM

## 2024-10-10 RX ORDER — METHYLERGONOVINE MALEATE 0.2 MG/ML
200 INJECTION INTRAVENOUS ONCE AS NEEDED
Status: DISCONTINUED | OUTPATIENT
Start: 2024-10-10 | End: 2024-10-10

## 2024-10-10 RX ORDER — SODIUM CHLORIDE, SODIUM LACTATE, POTASSIUM CHLORIDE, CALCIUM CHLORIDE 600; 310; 30; 20 MG/100ML; MG/100ML; MG/100ML; MG/100ML
75 INJECTION, SOLUTION INTRAVENOUS CONTINUOUS
Status: DISCONTINUED | OUTPATIENT
Start: 2024-10-10 | End: 2024-10-10

## 2024-10-10 RX ORDER — ONDANSETRON 4 MG/1
4 TABLET, ORALLY DISINTEGRATING ORAL EVERY 6 HOURS PRN
Status: DISCONTINUED | OUTPATIENT
Start: 2024-10-10 | End: 2024-10-10

## 2024-10-10 RX ORDER — OXYTOCIN/0.9 % SODIUM CHLORIDE 30/500 ML
2-20 PLASTIC BAG, INJECTION (ML) INTRAVENOUS
Status: DISCONTINUED | OUTPATIENT
Start: 2024-10-10 | End: 2024-10-10

## 2024-10-10 RX ORDER — CALCIUM CARBONATE 500 MG/1
2 TABLET, CHEWABLE ORAL 3 TIMES DAILY PRN
Status: DISCONTINUED | OUTPATIENT
Start: 2024-10-10 | End: 2024-10-13 | Stop reason: HOSPADM

## 2024-10-10 RX ORDER — SODIUM CHLORIDE 9 MG/ML
40 INJECTION, SOLUTION INTRAVENOUS AS NEEDED
Status: DISCONTINUED | OUTPATIENT
Start: 2024-10-10 | End: 2024-10-10

## 2024-10-10 RX ORDER — MAGNESIUM CARB/ALUMINUM HYDROX 105-160MG
30 TABLET,CHEWABLE ORAL ONCE AS NEEDED
Status: DISCONTINUED | OUTPATIENT
Start: 2024-10-10 | End: 2024-10-10

## 2024-10-10 RX ORDER — NIFEDIPINE 10 MG/1
10-20 CAPSULE ORAL
Status: DISCONTINUED | OUTPATIENT
Start: 2024-10-10 | End: 2024-10-10

## 2024-10-10 RX ORDER — IBUPROFEN 600 MG/1
600 TABLET, FILM COATED ORAL EVERY 6 HOURS PRN
Status: DISCONTINUED | OUTPATIENT
Start: 2024-10-10 | End: 2024-10-13 | Stop reason: HOSPADM

## 2024-10-10 RX ORDER — MISOPROSTOL 200 UG/1
800 TABLET ORAL ONCE AS NEEDED
Status: DISCONTINUED | OUTPATIENT
Start: 2024-10-10 | End: 2024-10-10

## 2024-10-10 RX ORDER — OXYTOCIN/0.9 % SODIUM CHLORIDE 30/500 ML
125 PLASTIC BAG, INJECTION (ML) INTRAVENOUS ONCE AS NEEDED
Status: DISCONTINUED | OUTPATIENT
Start: 2024-10-10 | End: 2024-10-13 | Stop reason: HOSPADM

## 2024-10-10 RX ORDER — TERBUTALINE SULFATE 1 MG/ML
0.25 INJECTION, SOLUTION SUBCUTANEOUS AS NEEDED
Status: DISCONTINUED | OUTPATIENT
Start: 2024-10-10 | End: 2024-10-10

## 2024-10-10 RX ORDER — BISACODYL 10 MG
10 SUPPOSITORY, RECTAL RECTAL DAILY PRN
Status: DISCONTINUED | OUTPATIENT
Start: 2024-10-11 | End: 2024-10-13 | Stop reason: HOSPADM

## 2024-10-10 RX ADMIN — Medication 8 ML/HR: at 07:33

## 2024-10-10 RX ADMIN — SODIUM CHLORIDE, POTASSIUM CHLORIDE, SODIUM LACTATE AND CALCIUM CHLORIDE 125 ML/HR: 600; 310; 30; 20 INJECTION, SOLUTION INTRAVENOUS at 06:29

## 2024-10-10 RX ADMIN — IBUPROFEN 600 MG: 600 TABLET, FILM COATED ORAL at 14:26

## 2024-10-10 RX ADMIN — ROPIVACAINE HYDROCHLORIDE 6 ML: 2 INJECTION, SOLUTION EPIDURAL; INFILTRATION at 07:33

## 2024-10-10 RX ADMIN — IBUPROFEN 600 MG: 600 TABLET, FILM COATED ORAL at 20:34

## 2024-10-10 RX ADMIN — ACETAMINOPHEN 325MG 650 MG: 325 TABLET ORAL at 18:34

## 2024-10-10 RX ADMIN — Medication 2 MILLI-UNITS/MIN: at 06:29

## 2024-10-10 RX ADMIN — Medication 999 ML/HR: at 11:18

## 2024-10-10 RX ADMIN — SODIUM CHLORIDE, POTASSIUM CHLORIDE, SODIUM LACTATE AND CALCIUM CHLORIDE 50 ML/HR: 600; 310; 30; 20 INJECTION, SOLUTION INTRAVENOUS at 08:48

## 2024-10-10 RX ADMIN — HYDROCODONE BITARTRATE AND ACETAMINOPHEN 1 TABLET: 10; 325 TABLET ORAL at 14:47

## 2024-10-10 RX ADMIN — Medication 25 MCG: at 02:37

## 2024-10-10 RX ADMIN — SODIUM CHLORIDE, POTASSIUM CHLORIDE, SODIUM LACTATE AND CALCIUM CHLORIDE 75 ML/HR: 600; 310; 30; 20 INJECTION, SOLUTION INTRAVENOUS at 22:39

## 2024-10-10 RX ADMIN — LABETALOL HYDROCHLORIDE 20 MG: 5 INJECTION, SOLUTION INTRAVENOUS at 04:16

## 2024-10-10 RX ADMIN — LABETALOL HYDROCHLORIDE 20 MG: 5 INJECTION, SOLUTION INTRAVENOUS at 08:37

## 2024-10-10 RX ADMIN — SODIUM CHLORIDE, POTASSIUM CHLORIDE, SODIUM LACTATE AND CALCIUM CHLORIDE 125 ML/HR: 600; 310; 30; 20 INJECTION, SOLUTION INTRAVENOUS at 01:38

## 2024-10-10 RX ADMIN — MAGNESIUM SULFATE HEPTAHYDRATE 2 G/HR: 40 INJECTION, SOLUTION INTRAVENOUS at 16:53

## 2024-10-10 NOTE — PLAN OF CARE
Goal Outcome Evaluation:      , 39.1 weeks, elective induction, no significant history, blood pressures elevated on arrival, blood pressure protocol ordered, will continue to monitor blood pressures, pitocin ordered to start at 0630.

## 2024-10-10 NOTE — ANESTHESIA PROCEDURE NOTES
Labor Epidural    Pre-sedation assessment completed: 10/10/2024 7:17 AM    Patient reassessed immediately prior to procedure    Patient location during procedure: OB  Start Time: 10/10/2024 7:17 AM  Stop Time: 10/10/2024 7:32 AM  Indication:at surgeon's request  Performed By  Anesthesiologist: Kalen Patel MD  Preanesthetic Checklist  Completed: patient identified, risks and benefits discussed and timeout performed  Prep:  Pt Position:sitting  Sterile Tech:cap, gloves, mask and sterile barrier  Prep:chlorhexidine gluconate and isopropyl alcohol  Monitoring:blood pressure monitoring, continuous pulse oximetry and EKG  Epidural Block Procedure:  Approach:midline  Guidance:landmark technique and palpation technique  Location:L3-L4  Needle Type:Tuohy  Needle Gauge:17 G  Loss of Resistance Medium: saline  Loss of Resistance: 8cm  Cath Depth at skin:14 cm  Paresthesia: none  Aspiration:negative  Test Dose:negative  Number of Attempts: 2  Post Assessment:  Dressing:occlusive dressing applied and secured with tape  Pt Tolerance:patient tolerated the procedure well with no apparent complications  Complications:no

## 2024-10-10 NOTE — PLAN OF CARE
Problem: Adult Inpatient Plan of Care  Goal: Plan of Care Review  Outcome: Adequate for Care Transition  Flowsheets (Taken 10/10/2024 1212)  Progress: improving  Plan of Care Reviewed With:   spouse   patient   family  Outcome Evaluation: IOL, 1 dose of cytotec, started on pitocin, AROM at 0901, complete dilation at 1100, viable baby boy born at 1115, VSS, Bleeding WDL, will continue to monitor.  Goal: Patient-Specific Goal (Individualized)  Outcome: Adequate for Care Transition  Flowsheets (Taken 10/10/2024 1212)  Patient-Specific Goals (Include Timeframe): Stable BPs and healthy mom/baby at discharge.  Individualized Care Needs: control BPs  Anxieties, Fears or Concerns: Fears associated with magnesium and first time baby.  Goal: Absence of Hospital-Acquired Illness or Injury  Outcome: Adequate for Care Transition  Intervention: Identify and Manage Fall Risk  Recent Flowsheet Documentation  Taken 10/10/2024 1145 by Juan Francisco Bernal RN  Safety Promotion/Fall Prevention: safety round/check completed  Goal: Optimal Comfort and Wellbeing  Outcome: Adequate for Care Transition  Intervention: Provide Person-Centered Care  Recent Flowsheet Documentation  Taken 10/10/2024 1145 by Juan Francisco Bernal RN  Trust Relationship/Rapport:   care explained   emotional support provided   choices provided   empathic listening provided   questions encouraged   questions answered   reassurance provided   thoughts/feelings acknowledged  Goal: Readiness for Transition of Care  Outcome: Adequate for Care Transition     Problem: Bleeding (Labor)  Goal: Hemostasis  Outcome: Adequate for Care Transition     Problem: Change in Fetal Wellbeing (Labor)  Goal: Stable Fetal Wellbeing  Outcome: Adequate for Care Transition     Problem: Delayed Labor Progression (Labor)  Goal: Effective Progression to Delivery  Outcome: Adequate for Care Transition     Problem: Infection (Labor)  Goal: Absence of Infection Signs and Symptoms  Outcome: Adequate for  Care Transition     Problem: Labor Pain (Labor)  Goal: Acceptable Pain Control  Outcome: Adequate for Care Transition     Problem: Uterine Tachysystole (Labor)  Goal: Normal Uterine Contraction Pattern  Outcome: Adequate for Care Transition     Problem: Pain Acute  Goal: Acceptable Pain Control and Functional Ability  Outcome: Adequate for Care Transition     Problem: Fall Injury Risk  Goal: Absence of Fall and Fall-Related Injury  Outcome: Adequate for Care Transition  Intervention: Promote Injury-Free Environment  Recent Flowsheet Documentation  Taken 10/10/2024 1145 by Juan Francisco Bernal RN  Safety Promotion/Fall Prevention: safety round/check completed     Problem: Hypertensive Disorders in Pregnancy  Goal: Maternal-Fetal Stabilization  Outcome: Adequate for Care Transition     Problem: Skin Injury Risk Increased  Goal: Skin Health and Integrity  Outcome: Adequate for Care Transition   Goal Outcome Evaluation:  Plan of Care Reviewed With: spouse, patient, family        Progress: improving  Outcome Evaluation: IOL, 1 dose of cytotec, started on pitocin, AROM at 0901, complete dilation at 1100, viable baby boy born at 1115, VSS, Bleeding WDL, will continue to monitor.

## 2024-10-10 NOTE — ANESTHESIA POSTPROCEDURE EVALUATION
Patient: Miesha Bro    Procedure Summary       Date: 10/10/24 Room / Location:     Anesthesia Start: 0717 Anesthesia Stop: 1115    Procedure: LABOR ANALGESIA Diagnosis:     Scheduled Providers:  Provider: Kalen Patel MD    Anesthesia Type: epidural ASA Status: 2            Anesthesia Type: epidural    Vitals  Vitals Value Taken Time   /89 10/10/24 1415   Temp 36.6 °C (97.9 °F) 10/10/24 0931   Pulse 76 10/10/24 1424   Resp 16 10/10/24 1315   SpO2 99 % 10/10/24 1424   Vitals shown include unfiled device data.        Post Anesthesia Care and Evaluation      Comments: Anesthesia present throughout labor and delivery

## 2024-10-10 NOTE — ANESTHESIA PREPROCEDURE EVALUATION
Anesthesia Evaluation     Patient summary reviewed and Nursing notes reviewed   no history of anesthetic complications:   NPO Solid Status: > 8 hours  NPO Liquid Status: > 2 hours           Airway   Mallampati: II  TM distance: >3 FB  Neck ROM: full  Dental      Pulmonary    Cardiovascular         Neuro/Psych  GI/Hepatic/Renal/Endo    (+) obesity    Musculoskeletal     Abdominal    Substance History      OB/GYN    (+) Pregnant        Other                    Anesthesia Plan    ASA 2     epidural     (39w1d)    Anesthetic plan, risks, benefits, and alternatives have been provided, discussed and informed consent has been obtained with: patient.    CODE STATUS:    Level Of Support Discussed With: Patient  Code Status (Patient has no pulse and is not breathing): CPR (Attempt to Resuscitate)  Medical Interventions (Patient has pulse or is breathing): Full Support

## 2024-10-10 NOTE — H&P
Psychiatric  Obstetric History and Physical    Chief Complaint   Patient presents with    Scheduled Induction       Subjective     Patient is a 24 y.o. female  currently at 39w1d, who presents for term elective induction of labor.  She received Cytotec overnight and then was started on Pitocin.  She had very painful contractions and received an epidural.  Her blood pressure has been elevated at times overnight and she received IV medication for treatment.  She denies any headache, visual changes.      The following portions of the patients history were reviewed and updated as appropriate: current medications, allergies, past medical history, past surgical history, problem list, and ob hx  .       Prenatal Information:       Prenatal Labs for [facility] Patients  Lab Results   Component Value Date    HGB 11.4 (L) 10/10/2024    HEPBSAG Negative 2024    ABSCRN Negative 10/10/2024    [id number] Non-Reactive 2024    HEPCVIRUSABY Non-reactive 2024       Prenatal Labs -- transcribed from paper records by Nurse  Lab Results   Component Value Date    ABO A 10/10/2024    RH Positive 10/10/2024    HEPBSAG Negative 2024    RPR Non-Reactive 2024    [id number] Non-Reactive 2024           Past OB History:       OB History    Para Term  AB Living   1 0 0 0 0 0   SAB IAB Ectopic Molar Multiple Live Births   0 0 0 0 0 0      # Outcome Date GA Lbr Jack/2nd Weight Sex Type Anes PTL Lv   1 Current                Past Medical History: Past Medical History:   Diagnosis Date    Herpes     Severe pre-eclampsia in third trimester 10/10/2024      Past Surgical History Past Surgical History:   Procedure Laterality Date    WISDOM TOOTH EXTRACTION        Family History: Family History   Problem Relation Age of Onset    Hypertension Father     Coronary artery disease Maternal Grandfather     Deep vein thrombosis Maternal Aunt     Congenital heart disease Neg Hx       Social  History:  reports that she has never smoked. She does not have any smokeless tobacco history on file.   reports that she does not currently use alcohol after a past usage of about 1.0 standard drink of alcohol per week.   reports no history of drug use.           Objective       Vital Signs Range for the last 24 hours  Temperature: Temp:  [97.9 °F (36.6 °C)-98.2 °F (36.8 °C)] 97.9 °F (36.6 °C)   Temp Source: Temp src: Oral   BP: BP: (104-180)/() 114/71   Pulse: Heart Rate:  [53-92] 82   Respirations: Resp:  [16] 16   SPO2: SpO2:  [99 %-100 %] 100 %   O2 Amount (l/min):     O2 Devices     Weight: Weight:  [83 kg (182 lb 15.7 oz)-83 kg (183 lb)] 83 kg (182 lb 15.7 oz)     Physical Examination: General appearance - alert, well appearing, and in no distress  Chest - no tachypnea, retractions or cyanosis  Abdomen - fundus soft between ctx, ctx palpate strong, EFW approx 7 lb 8 oz  Pelvic - adequate pelvimetry  Extremities - no pedal edema noted  Skin - normal coloration and turgor, no rashes, no suspicious skin lesions noted    Presentation: vertex   Cervix: Exam by: Method: sterile exam per physician   Dilation: Cervical Dilation (cm): 6   Effacement: Cervical Effacement: 90%   Station: Fetal Station: -1       Fetal Heart Rate Assessment   Method: Fetal HR Assessment Method: external   Beats/min: Fetal HR (beats/min): 125   Baseline: Fetal HR Baseline: normal range   Varibility: Fetal HR Variability: moderate (amplitude range 6 to 25 bpm) (period of minimal)   Accels: Fetal HR Accelerations: greater than/equal to 15 bpm, lasting at least 15 seconds   Decels: Fetal HR Decelerations: absent   Tracing Category:       Uterine Assessment   Method: Method: external tocotransducer   Frequency (min): Contraction Frequency (Minutes): 2-3   Ctx Count in 10 min:     Duration:     Intensity: Contraction Intensity: moderate by palpation   Intensity by [facility]:     Resting Tone: Uterine Resting Tone: soft by palpation   Resting  Tone by [facility]:     [place] Units:       Results from last 7 days   Lab Units 10/10/24  0146   WBC 10*3/mm3 10.69   HEMOGLOBIN g/dL 11.4*   HEMATOCRIT % 35.5   PLATELETS 10*3/mm3 181       Assessment & Plan       Supervision of normal pregnancy    Severe pre-eclampsia in third trimester        Assessment:  1.  Intrauterine pregnancy at 39w1d weeks gestation with reactive fetal status.    2.  Induction of labor, preeclampsia with severe features  3.  Obstetrical history benign  4.  GBS status: negative      Plan:  1. AROM performed w clear fluid. Continue pitocin. Discussed preeclampsia diagnosis with her and her family. Given need to treat for severe range BP plan to start magnesium for seizure prophylaxis.  Her labs were normal upon admission, plan to repeat tomorrow  2. Plan of care has been reviewed with patient and her familu  3.  Risks, benefits of treatment plan have been discussed.  4.  All questions have been answered.        Paige Bernard MD  10/10/2024  09:44 EDT

## 2024-10-10 NOTE — L&D DELIVERY NOTE
[facility]   Vaginal Delivery Note    Patient Name: Miesha Bro  : 2000  MRN: [id number]    Date of Delivery: 10/10/2024     Diagnosis     Pre & Post-Delivery:  Intrauterine pregnancy at 39w1d  Labor status: Induced Onset of Labor     Supervision of normal pregnancy    Severe pre-eclampsia in third trimester    Type 3a perineal laceration    Vacuum-assisted vaginal delivery             Problem List    Transfer to Postpartum     Review the Delivery Report for details.     Delivery     Delivery: Vaginal, Vacuum (Extractor)     YOB: 2022    Time of Birth:  Gestational Age 11:15 AM   39w1d     Anesthesia: Epidural     Delivering clinician: Gisel Amador    Forceps?   No   Vacuum? Yes  Vacuum Delivery  Vacuum attempted? No     Vacuum indication:     Vacuum type: Kiwi Pro (bell)    Application location:     First Attempt     Time applied:     Time removed:     Second Attempt    Time applied:     Time removed:     Third Attempt    Time applied:     Time removed:     Number of pulls: 1    Number of pop-offs: 0    Low-end pressure range:     High-end pressure range:     Total application time:     Applied by: GISEL AMADOR MD    Failed? No        Shoulder dystocia present: No        Delivery narrative:  Miesha Bro was admitted for term elective induction of labor.  She received Cytotec and then was started on Pitocin.  She received an epidural for pain control.  During her labor course, she was noted to have severe range blood pressures and received antihypertensive medications.  She was also started on magnesium for seizure prophylaxis.  She entered an active phase of labor and became complete and +2 station.  When pushing commenced, her baby began having deep variable and at times prolonged decelerations to a kunal of 60 bpm.  The fetal station was +2, the scalp was visible at the introitus.  As she was making only a small amount of progress with pushing and there was concern for  fetal wellbeing, I discussed with her using a vacuum to assist in delivery.  A small right mediolateral episiotomy was performed as the perineum was quite tight.  One more attempt at pushing was made with the episiotomy and delivery was still not affected and so the vacuum was placed in proper position and ensuring that there is no trapped maternal tissue.  The suction was applied to the green zone and with one push and gentle downward outward traction the head delivered over a supported perineum in left occiput anterior position.  The shoulders and body delivered readily.  The vigorous  was handed to mom for skin to skin.  Delayed cord clamping was performed.  Cord gases were obtained.  The placenta delivered readily with gentle traction.  A uterine sweep confirmed no retained products.  A type III a perineal laceration was noted.  There is only a small disruption of the sphincter capsule and a small amount of muscle fiber.  This was reapproximated in an end-to-end fashion using 2-0 Vicryl in interrupted sutures.  The remainder of the laceration was repaired in 2-0 Vicryl in usual fashion.  A thorough rectal examination was performed and there was no defect of the mucosa or sutures in the rectal mucosa.  Mother and baby were doing well at the conclusion of the delivery.      Infant     Findings: male  infant     Infant observations: Weight: No birth weight on file.   Length:   in  Observations/Comments:        Apgars: 8  @ 1 minute /    9  @ 5 minutes   Infant Name: Andrey Smallwood     Placenta & Cord         Placenta delivered  Spontaneous  at   10/10/2024 11:18 AM     Cord: Unknown  present.   Nuchal Cord?  no   Cord blood obtained: Yes    Cord gases obtained:  Yes    Cord gas results: Venous:    pH, Cord Venous   Date Value Ref Range Status   10/10/2024 7.280 7.260 - 7.400 pH Units Final     Comment:     Serial Number: [id number]:  [id number]     Base Excess, Cord Venous   Date Value Ref Range Status    10/10/2024 -4.5 -30.0 - 30.0 mmol/L Final       Arterial:    pH, Cord Arterial   Date Value Ref Range Status   10/10/2024 7.21 7.18 - 7.34 pH Units Final     Comment:     Serial Number: [id number]:  [id number]     Base Exc, Cord Arterial   Date Value Ref Range Status   10/10/2024 -5.7 (L) -2.0 - 2.0 mmol/L Final          Repair     Episiotomy: Right Mediolateral       Lacerations: Yes  Laceration Information  Laceration Repaired?   Perineal: 3rd  Yes    Periurethral:       Labial:       Sulcus:       Vaginal:       Cervical:         Suture used for repair: 2-0 Vicryl  Laceration Length for 3rd or 4th degree lacerations: 4 cm   Estimated Blood Loss: Est. Blood Loss (mL): 250 mL (10/10/24 1145)     Quantitative Blood Loss:              Complications     VAVD, partial third degree    Disposition     Mother to Mother Baby/Postpartum  in stable condition currently.  Baby to remains with mom  in stable condition currently.    Labs:    Lab Results   Component Value Date    RUBELLAABIGG 0.96 03/06/2024           Paige Bernard MD  10/10/24  12:09 EDT

## 2024-10-11 LAB
BASOPHILS # BLD AUTO: 0.05 10*3/MM3 (ref 0–0.2)
BASOPHILS NFR BLD AUTO: 0.3 % (ref 0–1.5)
DEPRECATED RDW RBC AUTO: 54.8 FL (ref 37–54)
EOSINOPHIL # BLD AUTO: 0.08 10*3/MM3 (ref 0–0.4)
EOSINOPHIL NFR BLD AUTO: 0.5 % (ref 0.3–6.2)
ERYTHROCYTE [DISTWIDTH] IN BLOOD BY AUTOMATED COUNT: 18.1 % (ref 12.3–15.4)
HCT VFR BLD AUTO: 33.9 % (ref 34–46.6)
HGB BLD-MCNC: 10.9 G/DL (ref 12–15.9)
IMM GRANULOCYTES # BLD AUTO: 0.08 10*3/MM3 (ref 0–0.05)
IMM GRANULOCYTES NFR BLD AUTO: 0.5 % (ref 0–0.5)
LYMPHOCYTES # BLD AUTO: 1.36 10*3/MM3 (ref 0.7–3.1)
LYMPHOCYTES NFR BLD AUTO: 8.6 % (ref 19.6–45.3)
MCH RBC QN AUTO: 27 PG (ref 26.6–33)
MCHC RBC AUTO-ENTMCNC: 32.2 G/DL (ref 31.5–35.7)
MCV RBC AUTO: 83.9 FL (ref 79–97)
MONOCYTES # BLD AUTO: 0.63 10*3/MM3 (ref 0.1–0.9)
MONOCYTES NFR BLD AUTO: 4 % (ref 5–12)
NEUTROPHILS NFR BLD AUTO: 13.65 10*3/MM3 (ref 1.7–7)
NEUTROPHILS NFR BLD AUTO: 86.1 % (ref 42.7–76)
PLATELET # BLD AUTO: 192 10*3/MM3 (ref 140–450)
PMV BLD AUTO: 12.9 FL (ref 6–12)
RBC # BLD AUTO: 4.04 10*6/MM3 (ref 3.77–5.28)
WBC NRBC COR # BLD AUTO: 15.85 10*3/MM3 (ref 3.4–10.8)

## 2024-10-11 PROCEDURE — 25010000002 MAGNESIUM SULFATE 20 GM/500ML SOLUTION: Performed by: OBSTETRICS & GYNECOLOGY

## 2024-10-11 PROCEDURE — 85025 COMPLETE CBC W/AUTO DIFF WBC: CPT | Performed by: OBSTETRICS & GYNECOLOGY

## 2024-10-11 PROCEDURE — 63710000001 DIPHENHYDRAMINE PER 50 MG: Performed by: OBSTETRICS & GYNECOLOGY

## 2024-10-11 RX ADMIN — IBUPROFEN 600 MG: 600 TABLET, FILM COATED ORAL at 15:10

## 2024-10-11 RX ADMIN — HYDROCODONE BITARTRATE AND ACETAMINOPHEN 1 TABLET: 5; 325 TABLET ORAL at 13:21

## 2024-10-11 RX ADMIN — Medication: at 15:10

## 2024-10-11 RX ADMIN — HYDROCODONE BITARTRATE AND ACETAMINOPHEN 1 TABLET: 5; 325 TABLET ORAL at 07:57

## 2024-10-11 RX ADMIN — IBUPROFEN 600 MG: 600 TABLET, FILM COATED ORAL at 03:36

## 2024-10-11 RX ADMIN — DOCUSATE SODIUM 100 MG: 100 CAPSULE, LIQUID FILLED ORAL at 21:55

## 2024-10-11 RX ADMIN — DOCUSATE SODIUM 100 MG: 100 CAPSULE, LIQUID FILLED ORAL at 08:58

## 2024-10-11 RX ADMIN — HYDROCODONE BITARTRATE AND ACETAMINOPHEN 1 TABLET: 5; 325 TABLET ORAL at 18:33

## 2024-10-11 RX ADMIN — ACETAMINOPHEN 325MG 650 MG: 325 TABLET ORAL at 00:48

## 2024-10-11 RX ADMIN — MAGNESIUM SULFATE HEPTAHYDRATE 2 G/HR: 40 INJECTION, SOLUTION INTRAVENOUS at 02:45

## 2024-10-11 RX ADMIN — IBUPROFEN 600 MG: 600 TABLET, FILM COATED ORAL at 21:55

## 2024-10-11 RX ADMIN — DIPHENHYDRAMINE HYDROCHLORIDE 25 MG: 25 CAPSULE ORAL at 01:53

## 2024-10-11 NOTE — LACTATION NOTE
This note was copied from a baby's chart.  P1,T Post Mag for severe preeclampsia. Original feeding plan was breast and formula supplement but baby has had only formula while mom was on Magnesium therapy. She  has not pumped yet and is planning on pumping and maybe bf when she gets home but isn't sure what she would like to do. Encouraged breast stimulation by HGP or bf to ensure best/ adequate milk supply as soon as possible and to call if she changed her mind and wants to pump here. She does have a PBP at home. Recommended calling LC for any help and review bf education in PP handbook.

## 2024-10-11 NOTE — PROGRESS NOTES
[facility]   Obstetrics and Gynecology     10/11/2024    Name:Miesha Bro   MR#:[id number]    Vaginal Delivery Progress Note    HD#1    Subjective   Postpartum Day 1: 24 y.o. female  delivered at 39w1d  delivered a male  infant.     The patient feels well.  Her pain is controlled.    She is ambulating well.  Patient describes her bleeding as moderate lochia.    Delivery was complicated by preeclampsia with severe features for which she is now receiving magnesium.  Blood pressure has been well-controlled without medication.  Denies headache, blurry vision, shortness of air, right upper quadrant/midepigastric pain.    Breastfeeding/bottle:  The patient is currently breastfeeding.    Patient Active Problem List   Diagnosis    Supervision of normal pregnancy    Severe pre-eclampsia in third trimester    Type 3a perineal laceration    Vacuum-assisted vaginal delivery       Objective   Vital Signs Range for the last 24 hours  Temp: Temp:  [97.9 °F (36.6 °C)-98.6 °F (37 °C)] 98.6 °F (37 °C) Temp src: Oral   BP: BP: (104-150)/() 138/93        Pulse: Heart Rate:  [] 73  RR: Resp:  [15-17] 15  Weight: 83 kg (182 lb 15.7 oz)  BMI:  Body mass index is 33.46 kg/m².    Results from last 7 days   Lab Units 10/11/24  0601 10/10/24  0146   WBC 10*3/mm3 15.85* 10.69   HEMOGLOBIN g/dL 10.9* 11.4*   HEMATOCRIT % 33.9* 35.5   PLATELETS 10*3/mm3 192 181     Results from last 7 days   Lab Units 10/10/24  0146   SODIUM mmol/L 136   POTASSIUM mmol/L 3.7   CHLORIDE mmol/L 105   CO2 mmol/L 20.1*   BUN mg/dL 10   CREATININE mg/dL 0.48*   CALCIUM mg/dL 8.8   ALK PHOS U/L 90   ALT (SGPT) U/L 17   AST (SGOT) U/L 20   GLUCOSE mg/dL 74       Physical Exam  Vitals and nursing note reviewed.   Constitutional:       General: She is not in acute distress.     Appearance: Normal appearance.   Cardiovascular:      Pulses: Normal pulses.   Pulmonary:      Effort: Pulmonary effort is normal.   Abdominal:      General:  There is no distension.      Palpations: Abdomen is soft.      Tenderness: There is no abdominal tenderness. There is no guarding or rebound.   Genitourinary:     Comments: Uterus firm and below umbilicus  Musculoskeletal:         General: No swelling or tenderness.      Right lower leg: No edema.      Left lower leg: No edema.   Neurological:      Mental Status: She is alert and oriented to person, place, and time.   Psychiatric:         Mood and Affect: Mood normal.         Behavior: Behavior normal.         Assessment/Plan   1.  PPD# 1    Supervision of normal pregnancy    Severe pre-eclampsia in third trimester    Type 3a perineal laceration    Vacuum-assisted vaginal delivery    2.  Preeclampsia with severe features - BP well-controlled without medications, magnesium will discontinue at 1115 (24 hours postpartum), she has diuresed well    Dispo: transfer to postpartum after magnesium complete    Fani Rosario Madrigal MD  10/11/2024 08:57 EDT

## 2024-10-11 NOTE — PLAN OF CARE
Goal Outcome Evaluation:                        Pt delivered on 10/10 at 1115. Magnesium sulfate to come off on 10/11 at 1115. No complaints of headache and blurry vision. Reflexes are 2+. Good urine output. OK to continue plan of care.  Problem: Skin Injury Risk Increased  Goal: Skin Health and Integrity  Outcome: Progressing     Problem: Adjustment to Role Transition (Postpartum Vaginal Delivery)  Goal: Successful Maternal Role Transition  Outcome: Progressing     Problem: Bleeding (Postpartum Vaginal Delivery)  Goal: Hemostasis  Outcome: Progressing     Problem: Infection (Postpartum Vaginal Delivery)  Goal: Absence of Infection Signs/Symptoms  Outcome: Progressing     Problem: Pain (Postpartum Vaginal Delivery)  Goal: Acceptable Pain Control  Outcome: Progressing     Problem: Urinary Retention (Postpartum Vaginal Delivery)  Goal: Effective Urinary Elimination  Outcome: Progressing

## 2024-10-12 RX ADMIN — IBUPROFEN 600 MG: 600 TABLET, FILM COATED ORAL at 15:52

## 2024-10-12 RX ADMIN — HYDROCODONE BITARTRATE AND ACETAMINOPHEN 1 TABLET: 5; 325 TABLET ORAL at 20:40

## 2024-10-12 RX ADMIN — HYDROCODONE BITARTRATE AND ACETAMINOPHEN 1 TABLET: 10; 325 TABLET ORAL at 08:07

## 2024-10-12 RX ADMIN — DOCUSATE SODIUM 100 MG: 100 CAPSULE, LIQUID FILLED ORAL at 08:06

## 2024-10-12 RX ADMIN — ACETAMINOPHEN 325MG 650 MG: 325 TABLET ORAL at 15:51

## 2024-10-12 RX ADMIN — DOCUSATE SODIUM 100 MG: 100 CAPSULE, LIQUID FILLED ORAL at 20:41

## 2024-10-12 RX ADMIN — HYDROCODONE BITARTRATE AND ACETAMINOPHEN 1 TABLET: 10; 325 TABLET ORAL at 12:11

## 2024-10-12 RX ADMIN — Medication 1 TABLET: at 08:07

## 2024-10-12 RX ADMIN — HYDROCODONE BITARTRATE AND ACETAMINOPHEN 1 TABLET: 5; 325 TABLET ORAL at 02:25

## 2024-10-12 RX ADMIN — IBUPROFEN 600 MG: 600 TABLET, FILM COATED ORAL at 07:08

## 2024-10-12 RX ADMIN — ACETAMINOPHEN 325MG 650 MG: 325 TABLET ORAL at 08:07

## 2024-10-12 NOTE — PLAN OF CARE
Goal Outcome Evaluation:  Plan of Care Reviewed With: patient        Progress: improving  Outcome Evaluation: AVSS, ambulating in room and mosqueda without any problems, taking in po fluid well, bottle feeding baby well, turned in all D/C paperwork, and plans to D/C in AM, denies any blurred vision or dizziness.

## 2024-10-12 NOTE — PLAN OF CARE
Goal Outcome Evaluation:  Plan of Care Reviewed With: patient        Progress: improving  Outcome Evaluation: VSS.  Pt up ad brunilda and voiding without difficulty.  Fundal assessment and lochia, wnl.                             Problem: Skin Injury Risk Increased  Goal: Skin Health and Integrity  Outcome: Resolved for upgrade, new template will be applied     Problem: Adjustment to Role Transition (Postpartum Vaginal Delivery)  Goal: Successful Maternal Role Transition  Outcome: Resolved for upgrade, new template will be applied     Problem: Bleeding (Postpartum Vaginal Delivery)  Goal: Hemostasis  Outcome: Resolved for upgrade, new template will be applied     Problem: Infection (Postpartum Vaginal Delivery)  Goal: Absence of Infection Signs/Symptoms  Outcome: Resolved for upgrade, new template will be applied     Problem: Pain (Postpartum Vaginal Delivery)  Goal: Acceptable Pain Control  Outcome: Resolved for upgrade, new template will be applied     Problem: Urinary Retention (Postpartum Vaginal Delivery)  Goal: Effective Urinary Elimination  Outcome: Resolved for upgrade, new template will be applied

## 2024-10-12 NOTE — PROGRESS NOTES
"[facility]  Vaginal Delivery Progress Note        Postpartum Day 2: Vaginal Delivery    The patient feels well.  Her pain is well controlled with nonsteroidal anti-inflammatory drugs and Tylenol.   She is ambulating well.  Patient describes her bleeding as  less than a period .    Breastfeeding: has not attempted yet.     Problem list:  Patient Active Problem List   Diagnosis    Supervision of normal pregnancy    Severe pre-eclampsia in third trimester    Type 3a perineal laceration    Vacuum-assisted vaginal delivery                 Vital Signs Range for the last 24 hours  Temperature:     Temp Source:     BP:     Pulse:     Respirations:     SPO2:     O2 Amount (l/min):     O2 Devices     Weight:         Flowsheet Rows      Flowsheet Row First Filed Value   Admission Height 157.5 cm (62.01\") Documented at 10/10/2024 0223   Admission Weight 83 kg (183 lb) Documented at 10/10/2024 0116            Physical Exam:  General:  no acute distresss.  Abdomen: abdomen is soft without significant tenderness, masses, organomegaly or guarding. Fundus: appropriate, firm, non tender  Extremities: normal, atraumatic, no cyanosis, and trace edema.       Lab results reviewed:  Yes     Rh Status:  Positive   Immunizations:   There is no immunization history on file for this patient.          1. Miesha Bro is Day 2  post-partum  Vaginal, Vacuum (Extractor)  .  Doing well. Encouraged ambulation. Reviewed diagnosis of preeclampsia.    2. Preeclampsia: BP normotensive s/p magnesium. Discussed likely discharge in AM if BP remains stable.       Plan:  Continue current care .      Ingrid Agosto MD  10/12/2024  19:22 EDT    "

## 2024-10-13 VITALS
RESPIRATION RATE: 17 BRPM | BODY MASS INDEX: 33.67 KG/M2 | WEIGHT: 182.98 LBS | HEART RATE: 103 BPM | HEIGHT: 62 IN | SYSTOLIC BLOOD PRESSURE: 131 MMHG | DIASTOLIC BLOOD PRESSURE: 90 MMHG | OXYGEN SATURATION: 100 % | TEMPERATURE: 98.2 F

## 2024-10-13 RX ORDER — NIFEDIPINE 30 MG
30 TABLET, EXTENDED RELEASE ORAL
Qty: 30 TABLET | Refills: 1 | Status: SHIPPED | OUTPATIENT
Start: 2024-10-14

## 2024-10-13 RX ORDER — NIFEDIPINE 30 MG/1
30 TABLET, EXTENDED RELEASE ORAL
Status: DISCONTINUED | OUTPATIENT
Start: 2024-10-13 | End: 2024-10-13 | Stop reason: HOSPADM

## 2024-10-13 RX ORDER — IBUPROFEN 600 MG/1
600 TABLET, FILM COATED ORAL EVERY 6 HOURS PRN
Qty: 50 TABLET | Refills: 1 | Status: SHIPPED | OUTPATIENT
Start: 2024-10-13

## 2024-10-13 RX ADMIN — NIFEDIPINE 30 MG: 30 TABLET, FILM COATED, EXTENDED RELEASE ORAL at 12:09

## 2024-10-13 RX ADMIN — Medication 1 TABLET: at 10:32

## 2024-10-13 RX ADMIN — DOCUSATE SODIUM 100 MG: 100 CAPSULE, LIQUID FILLED ORAL at 10:32

## 2024-10-13 RX ADMIN — IBUPROFEN 600 MG: 600 TABLET, FILM COATED ORAL at 04:08

## 2024-10-13 RX ADMIN — HYDROCODONE BITARTRATE AND ACETAMINOPHEN 1 TABLET: 5; 325 TABLET ORAL at 00:32

## 2024-10-13 NOTE — PROGRESS NOTES
[facility]   Obstetrics and Gynecology     10/13/2024    Name:Miesha Bro   MR#:[id number]    Vaginal Delivery Progress Note    HD#3    Subjective   Postpartum Day 2: 24 y.o. female  delivered at 39w1d  delivered a male infant.     The patient feels well.  Her pain is controlled.    She is ambulating well.  Patient describes her bleeding as moderate lochia.  No headache, vision changes, or RUQ pain    Breastfeeding/bottle:  The patient is currently breastfeeding.    Patient Active Problem List   Diagnosis    Supervision of normal pregnancy    Severe pre-eclampsia in third trimester    Type 3a perineal laceration    Vacuum-assisted vaginal delivery       Objective   Vital Signs Range for the last 24 hours  Temp: Temp:  [98 °F (36.7 °C)-98.8 °F (37.1 °C)] 98.2 °F (36.8 °C) Temp src: Oral   BP: BP: (132-140)/(84-97) 140/86        Pulse: Heart Rate:  [] 103  RR: Resp:  [16-17] 17  Weight: 83 kg (182 lb 15.7 oz)  BMI:  Body mass index is 33.46 kg/m².    Results from last 7 days   Lab Units 10/11/24  0601 10/10/24  0146   WBC 10*3/mm3 15.85* 10.69   HEMOGLOBIN g/dL 10.9* 11.4*   HEMATOCRIT % 33.9* 35.5   PLATELETS 10*3/mm3 192 181     Results from last 7 days   Lab Units 10/10/24  0146   SODIUM mmol/L 136   POTASSIUM mmol/L 3.7   CHLORIDE mmol/L 105   CO2 mmol/L 20.1*   BUN mg/dL 10   CREATININE mg/dL 0.48*   CALCIUM mg/dL 8.8   ALK PHOS U/L 90   ALT (SGPT) U/L 17   AST (SGOT) U/L 20   GLUCOSE mg/dL 74       Physical Exam  Well, no distress  Regular, nonlabored breathing  Fundus firm, well below umbilicus   Calves nontender, trace edema    Assessment/Plan   1.  PPD# 3    Supervision of normal pregnancy    Severe pre-eclampsia in third trimester    Type 3a perineal laceration    Vacuum-assisted vaginal delivery      Plan:   Plan to DC home today, will start nifedipine and plan for BP Check wed in the office.    Paige Bernard MD  10/13/2024 11:51 EDT

## 2024-10-13 NOTE — PLAN OF CARE
Goal Outcome Evaluation:  Plan of Care Reviewed With: patient        Progress: improving  Outcome Evaluation: VSS.  Pt up ad brunilda. Fundal assessment and lochia, wnl.

## 2024-10-13 NOTE — NURSING NOTE
Gave pt all d/c instructions and signs and symptoms to look out for regarding postpartum hypertension. Gave pt blue band to wear until next OB visit on Wednesday. Pt agrees to all instructions and all questions were answered.

## 2024-10-13 NOTE — DISCHARGE SUMMARY
Vaginal delivery Discharge Summary      Date of Admission: 10/10/2024    Date of Discharge:  10/13/2024    Patient: Miesha Bro      MR#:[id number]    Surgeon/OB: Paige Bernard    Discharge Diagnosis: Vaginal Delivery at 39w1d, severe preeclampsia uncomplicated recovery    Procedures:  Vaginal, Vacuum (Extractor)    10/10/2024   11:15 AM     Anesthesia:  Epidural    Presenting Problem/History of Present Illness  Supervision of normal pregnancy [Z34.90]     Patient Active Problem List   Diagnosis    Supervision of normal pregnancy    Severe pre-eclampsia in third trimester    Type 3a perineal laceration    Vacuum-assisted vaginal delivery       Hospital Course  Patient is a 24 y.o. female  at 39w1d status post vaginal delivery.    Uneventful recovery, had 24 hours of mag after.  Patient is ambulating, tolerating a regular diet.  Perineum is intact. She was started on nifedipine prior to DC    Infant:   male fetus 3220 g (7 lb 1.6 oz) with Apgar scores of 8 , 9  at five minutes.    Condition on Discharge:  Stable    Vital Signs  Temp:  [98 °F (36.7 °C)-98.8 °F (37.1 °C)] 98.2 °F (36.8 °C)  Heart Rate:  [] 103  Resp:  [16-17] 17  BP: (132-140)/(84-97) 140/86    Lab Results   Component Value Date    WBC 15.85 (H) 10/11/2024    HGB 10.9 (L) 10/11/2024    HCT 33.9 (L) 10/11/2024    MCV 83.9 10/11/2024     10/11/2024       Discharge Disposition  Home or Self Care    Discharge Medications     Discharge Medications        New Medications        Instructions Start Date   ibuprofen 600 MG tablet  Commonly known as: ADVIL,MOTRIN   600 mg, Oral, Every 6 Hours PRN             Continue These Medications        Instructions Start Date   ferrous sulfate 325 (65 FE) MG tablet   325 mg, Oral, Daily With Breakfast      folic acid-vit B6-vit B12 2.5-25-1 MG tablet tablet  Commonly known as: FOLBEE   Oral, Daily      ondansetron ODT 4 MG disintegrating tablet  Commonly known as: ZOFRAN-ODT   DISSOLVE 1 TABLET  ON THE TONGUE EVERY 6 HOURS AS NEEDED      prenatal vitamin 27-0.8 27-0.8 MG tablet tablet   Oral, Daily             Stop These Medications      valACYclovir 500 MG tablet  Commonly known as: Valtrex              Discharge Diet: Regular    Activity at Discharge:   Activity Instructions       Measure Blood Pressure      Check BP once or twice a day. If the top number is 160 or greater or the bottom number 110 or greater then please come to L&D    Pelvic Rest      Nothing in the vagina for six weeks            Follow-up Appointments  Future Appointments   Date Time Provider Department Center   10/16/2024  4:00 PM Paige Bernard MD [facility]  [facility]     Additional Instructions for the Follow-ups that You Need to Schedule       Call MD With Problems / Concerns   As directed      Monitor your bleeding and if is too heavy then please call the office or come to the hospital.   If you have fever, abdominal tenderness, or otherwise feel unwell please call or come to the hospital.  Watch for signs and symptoms of postpartum preeclamspia which include: high blood pressure (systolic 140 or greater, diastolic 90 or greater), terrible headache not relieved with tylenol, upper abdominal pain, persistently seeing spots or flashing lights, persistent nausea and vomiting. If you have any of these please call the office or come to the hospital.    Order Comments: Monitor your bleeding and if is too heavy then please call the office or come to the hospital. If you have fever, abdominal tenderness, or otherwise feel unwell please call or come to the hospital. Watch for signs and symptoms of postpartum preeclamspia which include: high blood pressure (systolic 140 or greater, diastolic 90 or greater), terrible headache not relieved with tylenol, upper abdominal pain, persistently seeing spots or flashing lights, persistent nausea and vomiting. If you have any of these please call the office or come to the hospital.         Discharge  Follow-up with Specified Provider: Follow up with your primary OB Wednesday   As directed      To: Follow up with your primary OB Wednesday                Paige Bernard MD  10/13/24  11:54 EDT

## 2024-10-16 ENCOUNTER — POSTPARTUM VISIT (OUTPATIENT)
Dept: OBSTETRICS AND GYNECOLOGY | Age: 24
End: 2024-10-16
Payer: OTHER GOVERNMENT

## 2024-10-16 VITALS
DIASTOLIC BLOOD PRESSURE: 80 MMHG | WEIGHT: 165.8 LBS | BODY MASS INDEX: 30.51 KG/M2 | HEIGHT: 62 IN | SYSTOLIC BLOOD PRESSURE: 126 MMHG

## 2024-10-16 NOTE — PROGRESS NOTES
"Chief Complaint   Patient presents with    Postpartum Follow-up     1 week PP Check, baby boy, \"Andreyadrian Smallwood\", Type 3a perineal laceration, pumping     Miesha Bro presents for 1 week postpartum checkup/blood pressure check.  She is doing really well, started breast-feeding by pumping.  She had been taking the nifedipine but did not take it today and her blood pressure is normal.  She has been checking her blood pressure at home and they have been normal as well.  No headache or vision changes  No mood complaints    /80   Ht 157.5 cm (62.01\")   Wt 75.2 kg (165 lb 12.8 oz)   LMP 01/10/2024   Breastfeeding Yes Comment: pumping  BMI 30.32 kg/m²     Discussed she can discontinue the nifedipine but if upon checking her blood pressure at home it is elevated 150s systolic or 100s diastolic needs to restart taking the nifedipine.    Desires OCPs for contraception    Follow-up in 2 to 3 weeks.    Paige Bernard MD  10/16/2024  16:16 EDT    "

## 2024-11-06 ENCOUNTER — POSTPARTUM VISIT (OUTPATIENT)
Dept: OBSTETRICS AND GYNECOLOGY | Age: 24
End: 2024-11-06
Payer: OTHER GOVERNMENT

## 2024-11-06 VITALS
DIASTOLIC BLOOD PRESSURE: 74 MMHG | BODY MASS INDEX: 28.89 KG/M2 | SYSTOLIC BLOOD PRESSURE: 114 MMHG | HEIGHT: 62 IN | WEIGHT: 157 LBS

## 2024-11-06 DIAGNOSIS — F41.8 POSTPARTUM ANXIETY: Primary | ICD-10-CM

## 2024-11-06 RX ORDER — HYDROXYZINE HYDROCHLORIDE 10 MG/1
10 TABLET, FILM COATED ORAL 3 TIMES DAILY PRN
Qty: 90 TABLET | Refills: 2 | Status: SHIPPED | OUTPATIENT
Start: 2024-11-06

## 2024-11-06 RX ORDER — CITALOPRAM HYDROBROMIDE 20 MG/1
20 TABLET ORAL DAILY
Qty: 90 TABLET | Refills: 2 | Status: SHIPPED | OUTPATIENT
Start: 2024-11-06

## 2024-11-06 NOTE — PROGRESS NOTES
"Subjective     Chief Complaint   Patient presents with    Postpartum Care     Post Partum - vaginal delivery on 10/10/24, baby boy (Andrey Smallwood) 7lb 1.6oz, pt is bottle feeding, pt c/o post partum anxiety, pt would like to start taking a BC pill & would like to discuss this today       Miesha Bro is a 24 y.o.  whose LMP is No LMP recorded. presents for a postpartum visit.  She notes that she has been having some terrible anxiety recently.  She does not have a diagnosis of OCD but has some OCD like tendencies and feels like this has been a lot worse lately.  She worries excessively, sometimes has a hard time sleeping, she feels guilty about things as well.  This is all compounded by her  having to leave and go back to [place] as he is in the .  Her mother and father have been helping out quite a bit and she is glad to have their help.  She denies any SI/HI  Stopped breastfeeding, formula feeding now  Some bleeding but not heavy    The following portions of the patient's history were reviewed and updated as appropriate:vital signs, allergies, current medications, past medical history, past social history, past surgical history, and problem list      Objective      /74 (BP Location: Right arm, Patient Position: Sitting)   Ht 157.5 cm (62.01\")   Wt 71.2 kg (157 lb)   Breastfeeding No Comment: formula feeding  BMI 28.71 kg/m²     Physical Exam  Well, no distress  Regular, nonlabored breathing  Occasionally tearful today, but affect is not flat and full range of motion      Assessment & Plan     Diagnoses and all orders for this visit:    1. Postpartum anxiety (Primary)    Other orders  -     citalopram (CeleXA) 20 MG tablet; Take 1 tablet by mouth Daily.  Dispense: 90 tablet; Refill: 2  -     hydrOXYzine (ATARAX) 10 MG tablet; Take 1 tablet by mouth 3 (Three) Times a Day As Needed for Anxiety.  Dispense: 90 tablet; Refill: 2    Recommend starting Celexa but it can take a few weeks to have " full effect and I ordered some hydroxyzine for more acute symptoms.  Also will make a referral for counseling  Discussed ED precautions  Follow-up in 4 weeks        Paige Bernard MD  11/6/2024

## 2024-11-18 ENCOUNTER — APPOINTMENT (OUTPATIENT)
Dept: ULTRASOUND IMAGING | Facility: HOSPITAL | Age: 24
End: 2024-11-18
Payer: OTHER GOVERNMENT

## 2024-11-18 ENCOUNTER — HOSPITAL ENCOUNTER (EMERGENCY)
Facility: HOSPITAL | Age: 24
Discharge: HOME | End: 2024-11-18
Attending: EMERGENCY MEDICINE | Admitting: EMERGENCY MEDICINE
Payer: OTHER GOVERNMENT

## 2024-11-18 VITALS
DIASTOLIC BLOOD PRESSURE: 94 MMHG | BODY MASS INDEX: 28.32 KG/M2 | WEIGHT: 150 LBS | HEIGHT: 61 IN | RESPIRATION RATE: 18 BRPM | TEMPERATURE: 96.2 F | HEART RATE: 57 BPM | OXYGEN SATURATION: 100 % | SYSTOLIC BLOOD PRESSURE: 129 MMHG

## 2024-11-18 DIAGNOSIS — N93.9 VAGINAL BLEEDING: Primary | ICD-10-CM

## 2024-11-18 LAB
ABO GROUP BLD: NORMAL
BASOPHILS # BLD AUTO: 0.09 10*3/MM3 (ref 0–0.2)
BASOPHILS NFR BLD AUTO: 1 % (ref 0–1.5)
BLD GP AB SCN SERPL QL: NEGATIVE
DEPRECATED RDW RBC AUTO: 48.7 FL (ref 37–54)
EOSINOPHIL # BLD AUTO: 0.26 10*3/MM3 (ref 0–0.4)
EOSINOPHIL NFR BLD AUTO: 2.9 % (ref 0.3–6.2)
ERYTHROCYTE [DISTWIDTH] IN BLOOD BY AUTOMATED COUNT: 15.7 % (ref 12.3–15.4)
HCG INTACT+B SERPL-ACNC: <1 MIU/ML
HCT VFR BLD AUTO: 43.2 % (ref 34–46.6)
HGB BLD-MCNC: 14.1 G/DL (ref 12–15.9)
HOLD SPECIMEN: NORMAL
HOLD SPECIMEN: NORMAL
IMM GRANULOCYTES # BLD AUTO: 0.02 10*3/MM3 (ref 0–0.05)
IMM GRANULOCYTES NFR BLD AUTO: 0.2 % (ref 0–0.5)
LYMPHOCYTES # BLD AUTO: 1.76 10*3/MM3 (ref 0.7–3.1)
LYMPHOCYTES NFR BLD AUTO: 19.4 % (ref 19.6–45.3)
MCH RBC QN AUTO: 27.8 PG (ref 26.6–33)
MCHC RBC AUTO-ENTMCNC: 32.6 G/DL (ref 31.5–35.7)
MCV RBC AUTO: 85 FL (ref 79–97)
MONOCYTES # BLD AUTO: 0.5 10*3/MM3 (ref 0.1–0.9)
MONOCYTES NFR BLD AUTO: 5.5 % (ref 5–12)
NEUTROPHILS NFR BLD AUTO: 6.45 10*3/MM3 (ref 1.7–7)
NEUTROPHILS NFR BLD AUTO: 71 % (ref 42.7–76)
NRBC BLD AUTO-RTO: 0 /100 WBC (ref 0–0.2)
PLATELET # BLD AUTO: 261 10*3/MM3 (ref 140–450)
PMV BLD AUTO: 10.8 FL (ref 6–12)
RBC # BLD AUTO: 5.08 10*6/MM3 (ref 3.77–5.28)
RH BLD: POSITIVE
T&S EXPIRATION DATE: NORMAL
WBC NRBC COR # BLD AUTO: 9.08 10*3/MM3 (ref 3.4–10.8)
WHOLE BLOOD HOLD COAG: NORMAL
WHOLE BLOOD HOLD SPECIMEN: NORMAL

## 2024-11-18 PROCEDURE — 36415 COLL VENOUS BLD VENIPUNCTURE: CPT

## 2024-11-18 PROCEDURE — 84702 CHORIONIC GONADOTROPIN TEST: CPT

## 2024-11-18 PROCEDURE — 86900 BLOOD TYPING SEROLOGIC ABO: CPT

## 2024-11-18 PROCEDURE — 86901 BLOOD TYPING SEROLOGIC RH(D): CPT

## 2024-11-18 PROCEDURE — 76856 US EXAM PELVIC COMPLETE: CPT

## 2024-11-18 PROCEDURE — 85025 COMPLETE CBC W/AUTO DIFF WBC: CPT

## 2024-11-18 PROCEDURE — 99284 EMERGENCY DEPT VISIT MOD MDM: CPT

## 2024-11-18 PROCEDURE — 76830 TRANSVAGINAL US NON-OB: CPT

## 2024-11-18 PROCEDURE — 86850 RBC ANTIBODY SCREEN: CPT

## 2024-11-18 RX ORDER — SODIUM CHLORIDE 0.9 % (FLUSH) 0.9 %
10 SYRINGE (ML) INJECTION AS NEEDED
Status: DISCONTINUED | OUTPATIENT
Start: 2024-11-18 | End: 2024-11-18 | Stop reason: HOSPADM

## 2024-11-18 NOTE — DISCHARGE INSTRUCTIONS
Rest at home avoiding any particularly strenuous activity today.     Eat small, frequent meals and drink plenty of fluids. You can take over the counter ibuprofen for cramping and bleeding--follow package instructions.    Monitor for any signs of recurrent symptoms or worsening and see your primary doctor to discuss your ER visit while returning to the ER if any concerns as we discussed.

## 2024-11-18 NOTE — ED TRIAGE NOTES
Pt c/o intermittent vaginal bleeding since delivery 5wks ago. Pt states that over the last couple days, she started to have increase in bleeding with blood clots

## 2024-11-18 NOTE — ED PROVIDER NOTES
EMERGENCY DEPARTMENT ENCOUNTER    Room Number:  10/10  PCP: Charity Harris APRN  Independent Historians: Patient    HPI:  Chief Complaint: had concerns including Vaginal Bleeding.      A complete HPI/ROS/PMH/PSH/SH/FH are unobtainable due to: None    Chronic or social conditions impacting patient care (Social Determinants of Health): None      Context: Miesha Bro is a 24 y.o. female with a medical history of recent vaginal delivery who presents to the ED c/o acute increase in vaginal bleeding for the past 3 to 4 days.  Patient is about 5 weeks postpartum.  Patient stopped any breast-feeding or breast pumping about 2 weeks ago.  She did note bleeding slowed down to just spotting until 3 to 4 days ago when she started bleeding again.  Patient denies any pain but got concerned about passing some blood clots today.        Review of prior external notes (non-ED) -and- Review of prior external test results outside of this encounter: Patient discharged on October 13 after having a vaginal delivery on October 10 at 39 weeks 1 day, severe preeclampsia.  Patient had mag 24 hours after delivery and had an uneventful recovery.  Patient was discharged on nifedipine.  She was noted follow-up appointment on October 16 abnormal blood pressure despite not taking the medication.  Patient was told to restart it if noted any blood pressures 150s systolic or 100s diastolic    Prescription drug monitoring program review:     N/A    PAST MEDICAL HISTORY  Active Ambulatory Problems     Diagnosis Date Noted    Supervision of normal pregnancy 07/24/2024    Severe pre-eclampsia in third trimester 10/10/2024    Type 3a perineal laceration 10/10/2024    Vacuum-assisted vaginal delivery 10/10/2024     Resolved Ambulatory Problems     Diagnosis Date Noted    No Resolved Ambulatory Problems     Past Medical History:   Diagnosis Date    Herpes          PAST SURGICAL HISTORY  Past Surgical History:   Procedure Laterality Date    WISDOM TOOTH  EXTRACTION           FAMILY HISTORY  Family History   Problem Relation Age of Onset    Hypertension Father     Coronary artery disease Maternal Grandfather     Deep vein thrombosis Maternal Aunt     Congenital heart disease Neg Hx          SOCIAL HISTORY  Social History     Socioeconomic History    Marital status:    Tobacco Use    Smoking status: Never     Passive exposure: Never    Smokeless tobacco: Never   Vaping Use    Vaping status: Never Used   Substance and Sexual Activity    Alcohol use: Not Currently     Alcohol/week: 1.0 standard drink of alcohol     Types: 1 Drinks containing 0.5 oz of alcohol per week     Comment: Dont drink due to being pregnant right now    Drug use: Never    Sexual activity: Yes     Partners: Male     Birth control/protection: None         ALLERGIES  Benzoyl perox-hydrocortisone        REVIEW OF SYSTEMS  Review of Systems  Included in HPI  All systems reviewed and negative except for those discussed in HPI.      PHYSICAL EXAM    I have reviewed the triage vital signs and nursing notes.    ED Triage Vitals   Temp Heart Rate Resp BP SpO2   11/18/24 1530 11/18/24 1530 11/18/24 1530 11/18/24 1549 11/18/24 1530   96.2 °F (35.7 °C) 83 18 147/85 98 %      Temp src Heart Rate Source Patient Position BP Location FiO2 (%)   11/18/24 1530 11/18/24 1549 11/18/24 1549 11/18/24 1549 --   Tympanic Monitor Sitting Left arm        Physical Exam  GENERAL: pleasant cooperative and well appearing f, alert, no acute distress  SKIN: Warm, dry  HENT: Normocephalic, atraumatic  EYES: no scleral icterus  RESPIRATORY: relaxed breathing  ABDOMEN: soft, nontender, nondistended  : see progress notes  MUSCULOSKELETAL: no deformity  NEURO: alert, moves all extremities, follows commands                                                                   LAB RESULTS  Recent Results (from the past 24 hours)   hCG, Quantitative, Pregnancy    Collection Time: 11/18/24  3:57 PM    Specimen: Blood   Result Value  Ref [place]    HCG Quantitative <1.00 mIU/mL   Type & Screen    Collection Time: 11/18/24  3:57 PM    Specimen: Blood   Result Value Ref [place]    ABO Type A     RH type Positive     Antibody Screen Negative     T&S Expiration Date 11/21/2024 11:59:59 PM    Green Top (Gel)    Collection Time: 11/18/24  3:57 PM   Result Value Ref [place]    Extra Tube Hold for add-ons.    Lavender Top    Collection Time: 11/18/24  3:57 PM   Result Value Ref [place]    Extra Tube hold for add-on    Gold Top - SST    Collection Time: 11/18/24  3:57 PM   Result Value Ref [place]    Extra Tube Hold for add-ons.    Light Blue Top    Collection Time: 11/18/24  3:57 PM   Result Value Ref [place]    Extra Tube Hold for add-ons.    CBC Auto Differential    Collection Time: 11/18/24  3:57 PM    Specimen: Blood   Result Value Ref [place]    WBC 9.08 3.40 - 10.80 10*3/mm3    RBC 5.08 3.77 - 5.28 10*6/mm3    Hemoglobin 14.1 12.0 - 15.9 g/dL    Hematocrit 43.2 34.0 - 46.6 %    MCV 85.0 79.0 - 97.0 fL    MCH 27.8 26.6 - 33.0 pg    MCHC 32.6 31.5 - 35.7 g/dL    RDW 15.7 (H) 12.3 - 15.4 %    RDW-SD 48.7 37.0 - 54.0 fl    MPV 10.8 6.0 - 12.0 fL    Platelets 261 140 - 450 10*3/mm3    Neutrophil % 71.0 42.7 - 76.0 %    Lymphocyte % 19.4 (L) 19.6 - 45.3 %    Monocyte % 5.5 5.0 - 12.0 %    Eosinophil % 2.9 0.3 - 6.2 %    Basophil % 1.0 0.0 - 1.5 %    Immature Grans % 0.2 0.0 - 0.5 %    Neutrophils, Absolute 6.45 1.70 - 7.00 10*3/mm3    Lymphocytes, Absolute 1.76 0.70 - 3.10 10*3/mm3    Monocytes, Absolute 0.50 0.10 - 0.90 10*3/mm3    Eosinophils, Absolute 0.26 0.00 - 0.40 10*3/mm3    Basophils, Absolute 0.09 0.00 - 0.20 10*3/mm3    Immature Grans, Absolute 0.02 0.00 - 0.05 10*3/mm3    nRBC 0.0 0.0 - 0.2 /100 WBC         RADIOLOGY  US Pelvis Complete    Result Date: 11/18/2024  ULTRASOUND PELVIS COMPLETE  INDICATION: VAGINAL bleeding, postpartum  COMPARISON: None  TECHNIQUE:  Real-time transabdominal imaging for general overview of the pelvis followed by transvaginal  imaging for more detailed evaluation of the pelvic organs and adnexa. Grayscale and color-flow imaging performed. Representative images were saved for review.  In addition, Doppler spectral analysis performed to assess arterial and venous blood flow to the ovaries given clinical concern for ovarian torsion.  FINDINGS: UTERUS: Position: Anteverted. Size: 7.8 x 4.5 x 5.9 cm. Myometrium: Homogeneous echotexture. Endometrium: 0.8 cm. Normal. No discrete mucosal lesion or abnormal vascularity. Cervix: Small amount of simple fluid seen in the endocervical canal.  RIGHT OVARY: 2.9 x 1.6 x 3.0 cm. Normal echotexture. Contains normal follicles. Arterial and venous blood flow are present.  LEFT OVARY: 2.4 x 1.4 x 1.6 cm.  Normal echotexture. Contains normal follicles. Arterial and venous blood flow are present.  FREE FLUID: Small amount of free fluid in the cul-de-sac and left adnexa.       1. Small amount of simple appearing fluid in the endocervical canal. Endometrial stripe is normal in thickness. 2. Small amount of free fluid in the cul-de-sac and left adnexa.  This report was finalized on 11/18/2024 5:11 PM by Dr. Trey Montes De Oca M.D on Workstation: [id number]         MEDICATIONS GIVEN IN ER  Medications - No data to display        ORDERS PLACED DURING THIS VISIT:  Orders Placed This Encounter   Procedures    US Non-ob Transvaginal    US Pelvis Complete    [facility] Draw    hCG, Quantitative, Pregnancy    CBC Auto Differential    Type & Screen    CBC & Differential    Green Top (Gel)    Lavender Top    Gold Top - SST    Light Blue Top         OUTPATIENT MEDICATION MANAGEMENT:  No current Epic-ordered facility-administered medications on file.     Current Outpatient Medications Ordered in Epic   Medication Sig Dispense Refill    citalopram (CeleXA) 20 MG tablet Take 1 tablet by mouth Daily. 90 tablet 2    ferrous sulfate 325 (65 FE) MG tablet Take 1 tablet by mouth Daily With Breakfast.      folic acid-vit B6-vit B12  (FOLBEE) 2.5-25-1 MG tablet tablet Take  by mouth Daily.      hydrOXYzine (ATARAX) 10 MG tablet Take 1 tablet by mouth 3 (Three) Times a Day As Needed for Anxiety. 90 tablet 2    ibuprofen (ADVIL,MOTRIN) 600 MG tablet Take 1 tablet by mouth Every 6 (Six) Hours As Needed for Mild Pain (First Line: Mild pain.). (Patient not taking: Reported on 11/6/2024) 50 tablet 1    NIFEdipine XL (ADALAT CC) 30 MG 24 hr tablet Take 1 tablet by mouth Daily. (Patient not taking: Reported on 11/6/2024) 30 tablet 1    ondansetron ODT (ZOFRAN-ODT) 4 MG disintegrating tablet DISSOLVE 1 TABLET ON THE TONGUE EVERY 6 HOURS AS NEEDED (Patient not taking: Reported on 11/6/2024)      Prenatal Vit-Fe Fumarate-FA (prenatal vitamin 27-0.8) 27-0.8 MG tablet tablet Take  by mouth Daily.           PROCEDURES  Procedures            PROGRESS, DATA ANALYSIS, CONSULTS, AND MEDICAL DECISION MAKING  All labs have been independently interpreted by me.  All radiology studies have been reviewed by me. All EKG's have been independently viewed and interpreted by me.  Discussion below represents my analysis of pertinent findings related to patient's condition, differential diagnosis, treatment plan and final disposition.    Differential diagnosis includes but is not limited to   Retained products after delivery, endometritis, vaginal laceration, cervix laceration/injury, menorrhagia, among other possibilities.  Plan for labs to include hcg quant and hemoglobin and for US. Will perform speculum exam as well.  Pt amenable to plan.          ED Course as of 11/18/24 2342   Mon Nov 18, 2024   1738 Speculum exam at bedside with BIBI Santos present. Pt elevated on bedpan, vaginal walls appear normal, one blood clot removed from vag vault with cotton tipped swab, no obvious bleeding laceration or any other abnormality [AR]   1748 I discussed all results with patient and answered all questions to the best of my ability. The patient was encouraged to follow up  appropriately.      Routine discharge counseling was given, and the patient was instructed to promptly call or followup with their primary physician or even return to the ER if any worsening, changing or persistent symptoms.         [AR]      ED Course User Index  [AR] Bre Zuñiga MD             AS OF 23:42 EST VITALS:    BP - 129/94  HR - 57  TEMP - 96.2 °F (35.7 °C) (Tympanic)  O2 SATS - 100%      COMPLEXITY OF CARE  Admission was considered but after careful review of the patient's presentation, physical examination, diagnostic results, and response to treatment the patient may be safely discharged with outpatient follow-up.    DIAGNOSIS  Final diagnoses:   Vaginal bleeding         DISPOSITION  ED Disposition       ED Disposition   Discharge    Condition   Stable    Comment   --                Please note that portions of this document were completed with a voice recognition program.    Note Disclaimer: At [facility], we believe that sharing information builds trust and better relationships. You are receiving this note because you recently visited [facility]. It is possible you will see health information before a provider has talked with you about it. This kind of information can be easy to misunderstand. To help you fully understand what it means for your health, we urge you to discuss this note with your provider.         Bre Zuñiga MD  11/19/24 1034

## 2024-11-18 NOTE — ED TRIAGE NOTES
Patient to er from home with c/o vaginal bleeding. Patient reported she is 5 week post vaginal delivery.

## 2024-11-19 ENCOUNTER — TELEPHONE (OUTPATIENT)
Dept: OBSTETRICS AND GYNECOLOGY | Age: 24
End: 2024-11-19
Payer: OTHER GOVERNMENT

## 2024-12-04 ENCOUNTER — POSTPARTUM VISIT (OUTPATIENT)
Dept: OBSTETRICS AND GYNECOLOGY | Age: 24
End: 2024-12-04
Payer: OTHER GOVERNMENT

## 2024-12-04 VITALS
BODY MASS INDEX: 31.04 KG/M2 | DIASTOLIC BLOOD PRESSURE: 84 MMHG | WEIGHT: 164.4 LBS | HEIGHT: 61 IN | SYSTOLIC BLOOD PRESSURE: 130 MMHG

## 2024-12-04 DIAGNOSIS — Z01.419 ENCOUNTER FOR GYNECOLOGICAL EXAMINATION WITHOUT ABNORMAL FINDING: Primary | ICD-10-CM

## 2024-12-04 PROBLEM — O14.13 SEVERE PRE-ECLAMPSIA IN THIRD TRIMESTER: Status: RESOLVED | Noted: 2024-10-10 | Resolved: 2024-12-04

## 2024-12-04 PROBLEM — Z34.90 SUPERVISION OF NORMAL PREGNANCY: Status: RESOLVED | Noted: 2024-07-24 | Resolved: 2024-12-04

## 2024-12-04 RX ORDER — LEVONORGESTREL/ETHIN.ESTRADIOL 0.1-0.02MG
1 TABLET ORAL DAILY
Qty: 84 TABLET | Refills: 3 | Status: SHIPPED | OUTPATIENT
Start: 2024-12-04 | End: 2025-12-04

## 2024-12-04 NOTE — PROGRESS NOTES
"Chief Complaint   Patient presents with    Postpartum Follow-up     8 week PP Check, baby boy, \"Andrey Smallwood\", 7lb 1.6oz, bottle feeding, interested in OCP     Miesha Bro is doing much better.  The zoloft has helped so much and no further sx PP Depression. Has a routine with baby now as well.  Ocps desired.  Not breastfeeding  Has not been SA and feels that perineum is well healed    /84   Ht 154.9 cm (61\")   Wt 74.6 kg (164 lb 6.4 oz)   LMP 11/18/2024   Breastfeeding No   BMI 31.06 kg/m²   Well, no distress  Regular, nonlabored breathing  The vulva appears normal, the perineum is well-healed  Speculum exam is normal, multiparous appearing cervix without lesion.  Pap obtained    Diagnoses and all orders for this visit:    1. Encounter for gynecological examination without abnormal finding (Primary)  -     IGP, Rfx Aptima HPV ASCU    Other orders  -     levonorgestrel-ethinyl estradiol (AVIANE,YUDY,LESSINA) 0.1-20 MG-MCG per tablet; Take 1 tablet by mouth Daily.  Dispense: 84 tablet; Refill: 3      Desires OCPs for contraception  Her mood has improved quite a bit, plan to follow-up on this in 6 months  Pap obtained today    Paige Bernard MD  12/4/2024  16:59 EST        "

## 2024-12-10 LAB
CONV .: NORMAL
CYTOLOGIST CVX/VAG CYTO: NORMAL
CYTOLOGY CVX/VAG DOC CYTO: NORMAL
CYTOLOGY CVX/VAG DOC THIN PREP: NORMAL
DX ICD CODE: NORMAL
Lab: NORMAL
OTHER STN SPEC: NORMAL
STAT OF ADQ CVX/VAG CYTO-IMP: NORMAL

## 2024-12-31 ENCOUNTER — TELEPHONE (OUTPATIENT)
Dept: FAMILY MEDICINE CLINIC | Facility: CLINIC | Age: 24
End: 2024-12-31
Payer: OTHER GOVERNMENT

## 2024-12-31 NOTE — TELEPHONE ENCOUNTER
Patient am needing a physical for . Last one she had was 10-. She's was just pregnant and and has been seeing her OB. Nothing has changed since her last physical. Can the physical from 10- be updated and used for this physical she needs now need.      [phone]

## 2025-01-14 ENCOUNTER — OFFICE VISIT (OUTPATIENT)
Dept: FAMILY MEDICINE CLINIC | Facility: CLINIC | Age: 25
End: 2025-01-14
Payer: OTHER GOVERNMENT

## 2025-01-14 VITALS
HEIGHT: 61 IN | HEART RATE: 66 BPM | SYSTOLIC BLOOD PRESSURE: 116 MMHG | WEIGHT: 161.6 LBS | BODY MASS INDEX: 30.51 KG/M2 | DIASTOLIC BLOOD PRESSURE: 78 MMHG | OXYGEN SATURATION: 97 %

## 2025-01-14 DIAGNOSIS — Z13.220 LIPID SCREENING: ICD-10-CM

## 2025-01-14 DIAGNOSIS — Z00.00 ANNUAL PHYSICAL EXAM: Primary | ICD-10-CM

## 2025-01-14 LAB
ALBUMIN SERPL-MCNC: 4.3 G/DL (ref 3.5–5.2)
ALBUMIN/GLOB SERPL: 1.5 G/DL
ALP SERPL-CCNC: 71 U/L (ref 39–117)
ALT SERPL-CCNC: 24 U/L (ref 1–33)
AST SERPL-CCNC: 20 U/L (ref 1–32)
BASOPHILS # BLD AUTO: 0.07 10*3/MM3 (ref 0–0.2)
BASOPHILS NFR BLD AUTO: 0.8 % (ref 0–1.5)
BILIRUB SERPL-MCNC: 0.3 MG/DL (ref 0–1.2)
BUN SERPL-MCNC: 15 MG/DL (ref 6–20)
BUN/CREAT SERPL: 19.7 (ref 7–25)
CALCIUM SERPL-MCNC: 9.9 MG/DL (ref 8.6–10.5)
CHLORIDE SERPL-SCNC: 103 MMOL/L (ref 98–107)
CHOLEST SERPL-MCNC: 227 MG/DL (ref 0–200)
CO2 SERPL-SCNC: 25.9 MMOL/L (ref 22–29)
CREAT SERPL-MCNC: 0.76 MG/DL (ref 0.57–1)
EGFRCR SERPLBLD CKD-EPI 2021: 113.8 ML/MIN/1.73
EOSINOPHIL # BLD AUTO: 0.17 10*3/MM3 (ref 0–0.4)
EOSINOPHIL NFR BLD AUTO: 2 % (ref 0.3–6.2)
ERYTHROCYTE [DISTWIDTH] IN BLOOD BY AUTOMATED COUNT: 13.8 % (ref 12.3–15.4)
GLOBULIN SER CALC-MCNC: 2.9 GM/DL
GLUCOSE SERPL-MCNC: 88 MG/DL (ref 65–99)
HCT VFR BLD AUTO: 39.8 % (ref 34–46.6)
HDLC SERPL-MCNC: 43 MG/DL (ref 40–60)
HGB BLD-MCNC: 13.3 G/DL (ref 12–15.9)
IMM GRANULOCYTES # BLD AUTO: 0.02 10*3/MM3 (ref 0–0.05)
IMM GRANULOCYTES NFR BLD AUTO: 0.2 % (ref 0–0.5)
LDLC SERPL CALC-MCNC: 153 MG/DL (ref 0–100)
LYMPHOCYTES # BLD AUTO: 1.27 10*3/MM3 (ref 0.7–3.1)
LYMPHOCYTES NFR BLD AUTO: 15.2 % (ref 19.6–45.3)
MCH RBC QN AUTO: 29.8 PG (ref 26.6–33)
MCHC RBC AUTO-ENTMCNC: 33.4 G/DL (ref 31.5–35.7)
MCV RBC AUTO: 89.2 FL (ref 79–97)
MONOCYTES # BLD AUTO: 0.42 10*3/MM3 (ref 0.1–0.9)
MONOCYTES NFR BLD AUTO: 5 % (ref 5–12)
NEUTROPHILS # BLD AUTO: 6.38 10*3/MM3 (ref 1.7–7)
NEUTROPHILS NFR BLD AUTO: 76.8 % (ref 42.7–76)
NRBC BLD AUTO-RTO: 0 /100 WBC (ref 0–0.2)
PLATELET # BLD AUTO: 265 10*3/MM3 (ref 140–450)
POTASSIUM SERPL-SCNC: 4.5 MMOL/L (ref 3.5–5.2)
PROT SERPL-MCNC: 7.2 G/DL (ref 6–8.5)
RBC # BLD AUTO: 4.46 10*6/MM3 (ref 3.77–5.28)
SODIUM SERPL-SCNC: 139 MMOL/L (ref 136–145)
TRIGL SERPL-MCNC: 168 MG/DL (ref 0–150)
VLDLC SERPL CALC-MCNC: 31 MG/DL (ref 5–40)
WBC # BLD AUTO: 8.33 10*3/MM3 (ref 3.4–10.8)

## 2025-01-14 PROCEDURE — 99213 OFFICE O/P EST LOW 20 MIN: CPT | Performed by: NURSE PRACTITIONER

## 2025-01-14 NOTE — PROGRESS NOTES
Subjective   Miesha Bor is a 24 y.o. female. Presents today for   Chief Complaint   Patient presents with    Annual Exam     For        History Of Present Illness Miesha Bernard is a 24-year-old female presenting today for an annual physical she is transferring with the  (her  is deploying)    History of Present Illness  The patient presents for a physical exam.    She is seeking a physical examination as part of the requirements for her impending relocation to [place], necessitated by her 's  service. She reports no presence of edema in her lower extremities.    Patient Active Problem List   Diagnosis    Type 3a perineal laceration    Vacuum-assisted vaginal delivery       Social History     Socioeconomic History    Marital status:    Tobacco Use    Smoking status: Never     Passive exposure: Never    Smokeless tobacco: Never   Vaping Use    Vaping status: Never Used   Substance and Sexual Activity    Alcohol use: Not Currently     Alcohol/week: 1.0 standard drink of alcohol     Types: 1 Drinks containing 0.5 oz of alcohol per week     Comment: Dont drink due to being pregnant right now    Drug use: Never    Sexual activity: Yes     Partners: Male     Birth control/protection: None       Allergies   Allergen Reactions    Benzoyl Perox-Hydrocortisone Rash     .       Current Outpatient Medications on File Prior to Visit   Medication Sig Dispense Refill    citalopram (CeleXA) 20 MG tablet Take 1 tablet by mouth Daily. 90 tablet 2    ferrous sulfate 325 (65 FE) MG tablet Take 1 tablet by mouth Daily With Breakfast.      hydrOXYzine (ATARAX) 10 MG tablet Take 1 tablet by mouth 3 (Three) Times a Day As Needed for Anxiety. 90 tablet 2    levonorgestrel-ethinyl estradiol (AVIANE,ALESSE,LESSINA) 0.1-20 MG-MCG per tablet Take 1 tablet by mouth Daily. 84 tablet 3    Prenatal Vit-Fe Fumarate-FA (prenatal vitamin 27-0.8) 27-0.8 MG tablet tablet Take  by mouth Daily.       No  "current facility-administered medications on file prior to visit.       Objective   Vitals:    01/14/25 0845   BP: 116/78   Pulse: 66   SpO2: 97%   Weight: 73.3 kg (161 lb 9.6 oz)   Height: 154.9 cm (61\")     Body mass index is 30.53 kg/m².    Physical Exam  Eyes were examined.  No lymph nodes detected in the neck.  Pulses were checked.  Physical Exam  Constitutional:       Appearance: Normal appearance.   HENT:      Head: Normocephalic and atraumatic.      Nose: Nose normal.      Mouth/Throat:      Mouth: Mucous membranes are moist.   Eyes:      Pupils: Pupils are equal, round, and reactive to light.   Cardiovascular:      Rate and Rhythm: Normal rate and regular rhythm.      Pulses: Normal pulses.      Heart sounds: Normal heart sounds.   Pulmonary:      Effort: Pulmonary effort is normal.      Breath sounds: Normal breath sounds.   Abdominal:      General: Bowel sounds are normal.   Musculoskeletal:         General: Normal range of motion.      Cervical back: Normal range of motion.   Skin:     General: Skin is warm and dry.      Capillary Refill: Capillary refill takes less than 2 seconds.   Neurological:      General: No focal deficit present.      Mental Status: She is alert.   Psychiatric:         Mood and Affect: Mood normal.       Results         Procedures     Assessment & Plan   Diagnoses and all orders for this visit:    1. Annual physical exam (Primary)  -     CBC & Differential  -     Comprehensive Metabolic Panel    2. Lipid screening  -     Lipid Panel         Assessment & Plan  1. Routine physical examination.  A comprehensive set of laboratory tests, including CBC, CMP, and lipid panel, will be conducted today. The results will be communicated to her via [employer] tomorrow.           BMI is >= 30 and <35. (Class 1 Obesity). The following options were offered after discussion;: weight loss educational material (shared in after visit summary), exercise counseling/recommendations, nutrition " counseling/recommendations, and pharmacological intervention options     No follow-ups on file.     Patient or patient representative verbalized consent for the use of Ambient Listening during the visit with  VÍCTOR Ignacio for chart documentation. 1/14/2025  09:08 EST

## 2025-01-15 NOTE — PROGRESS NOTES
Your cholesterol levels need a little bit of work. I would recommend diet and exercise to help normalize these levels, if you are unable to then we would need to discuss using a statin.  Your kidney and liver functions are fine at this time.

## 2025-02-06 NOTE — PROGRESS NOTES
Subjective   Miesha Bro is a 24 y.o. female. Presents today for   Chief Complaint   Patient presents with    Consult     Fill out Paper Work  for        History Of Present Illness Miesha Bro is a 24-year-old female presenting today for a form to be filled out that tells why she is on medication  Miesha gave birth 4 months ago, while her  was on sole deployment in [place].  She found she needed a little help to cope with the post-partum depression she was experiencing so I placed her on a low dose antidepressant and a low dose anxiolytic to help her cope with the postpartum blues.  She and her spouse are getting ready to re-deploy to [place] and she is facing new challenges with this move.  She continues to need a little extra help in this area.        History of Present Illness  The patient presents for evaluation of anxiety.    She is currently on a regimen of hydroxyzine and citalopram, prescribed by her obstetrician during the postpartum period. She has requested refills of these medications to ensure continuity of care until she can establish a relationship with a new physician following her relocation to [place] at the end of March 2025. She has expressed interest in understanding the process of gradually discontinuing these medications. She reports no history of suicidal ideation, substance abuse, addictive behaviors, or eating disorders.    Supplemental Information  She had an ER visit after she had her baby due to irregular bleeding, which she thought was a period. She stayed in the hospital for 4 days when she had her baby.    SOCIAL HISTORY  She is employed as a  for [employer] different [employer].    MEDICATIONS  Current: Hydroxyzine, citalopram    Patient Active Problem List   Diagnosis    Type 3a perineal laceration    Vacuum-assisted vaginal delivery       Social History     Socioeconomic History    Marital status:    Tobacco Use    Smoking status: Never     Passive  "exposure: Never    Smokeless tobacco: Never   Vaping Use    Vaping status: Never Used   Substance and Sexual Activity    Alcohol use: Not Currently     Alcohol/week: 1.0 standard drink of alcohol     Types: 1 Drinks containing 0.5 oz of alcohol per week     Comment: Dont drink due to being pregnant right now    Drug use: Never    Sexual activity: Yes     Partners: Male     Birth control/protection: None       Allergies   Allergen Reactions    Benzoyl Perox-Hydrocortisone Rash     .       Current Outpatient Medications on File Prior to Visit   Medication Sig Dispense Refill    hydrOXYzine (ATARAX) 10 MG tablet Take 1 tablet by mouth 3 (Three) Times a Day As Needed for Anxiety. 90 tablet 2    levonorgestrel-ethinyl estradiol (AVIANE,ALESSE,LESSINA) 0.1-20 MG-MCG per tablet Take 1 tablet by mouth Daily. 84 tablet 3    Prenatal Vit-Fe Fumarate-FA (prenatal vitamin 27-0.8) 27-0.8 MG tablet tablet Take  by mouth Daily.      [DISCONTINUED] citalopram (CeleXA) 20 MG tablet Take 1 tablet by mouth Daily. 90 tablet 2    [DISCONTINUED] ferrous sulfate 325 (65 FE) MG tablet Take 1 tablet by mouth Daily With Breakfast.       No current facility-administered medications on file prior to visit.       Objective   Vitals:    02/07/25 0812   BP: 108/62   Pulse: 70   SpO2: 96%   Weight: 73.2 kg (161 lb 6.4 oz)   Height: 154.9 cm (61\")     Body mass index is 30.5 kg/m².    Physical Exam    Physical Exam  HENT:      Head: Normocephalic and atraumatic.   Cardiovascular:      Rate and Rhythm: Normal rate and regular rhythm.   Pulmonary:      Effort: Pulmonary effort is normal.      Breath sounds: Normal breath sounds.   Neurological:      General: No focal deficit present.      Mental Status: She is alert.         Results         Procedures     Assessment & Plan   Diagnoses and all orders for this visit:    1. Depression affecting pregnancy, postpartum (Primary)  -     citalopram (CeleXA) 20 MG tablet; Take 1 tablet by mouth Daily.  " Dispense: 90 tablet; Refill: 3    2. Anxiety  -     hydrOXYzine (ATARAX) 10 MG tablet; Take 1 tablet by mouth 3 (Three) Times a Day As Needed for Itching.  Dispense: 270 tablet; Refill: 3         Assessment & Plan  1. Anxiety.  She is currently taking hydroxyzine and citalopram, prescribed by her OB for postpartum anxiety. A comprehensive form was completed on her behalf, detailing her current medication regimen and the necessity for continuation. She was advised to maintain her current medication regimen during her relocation process to [place]. A prescription refill for hydroxyzine and citalopram was provided. The process of gradual discontinuation of these medications was discussed, which involves reducing the dosage by half for a period of 30 days, followed by an alternate day regimen for another 30 days, before complete cessation.            Body mass index is 30.5 kg/m².    Discussed Care Gaps, ordered referrals and encouraged vaccination updates.       - Pt agrees with plan of care and denies further questions/concerns today  - This document is intended for medical expert use only. Persons  reading this document without medical staff guidance may result in misinterpretation and unintended morbidity     Go to the ER for any possible life-threatening symptoms such as chest pain or shortness of air.      Please allow 3-5 business days for recommendations based on new results      I personally spent time with this patient, preparing for the visit, reviewing tests, obtaining and/or reviewing a separately obtained history, performing a medically appropriate examination and/or evaluation, counseling and educating the patient/family/caregiver, ordering medications,  documenting information in the medical record and indepentently interpreting results.             Return once you return from [place].     Patient or patient representative verbalized consent for the use of Ambient Listening during the visit with  Charity JO  VÍCTOR Harris for chart documentation. 2/7/2025  08:55 EST     Answers submitted by the patient for this visit:  Problem not listed (Submitted on 2/6/2025)  Chief Complaint: Other medical problem  Reason for appointment: Paperwork for  move  abdominal pain: No  anorexia: No  joint pain: No  change in stool: No  chest pain: No  chills: No  nasal congestion: No  cough: No  diaphoresis: No  fatigue: No  fever: No  headaches: No  joint swelling: No  myalgias: No  nausea: No  neck pain: No  numbness: No  rash: No  sore throat: No  swollen glands: No  dysuria: No  vertigo: No  visual change: No  vomiting: No  weakness: No

## 2025-02-07 ENCOUNTER — OFFICE VISIT (OUTPATIENT)
Dept: FAMILY MEDICINE CLINIC | Facility: CLINIC | Age: 25
End: 2025-02-07
Payer: OTHER GOVERNMENT

## 2025-02-07 VITALS
SYSTOLIC BLOOD PRESSURE: 108 MMHG | DIASTOLIC BLOOD PRESSURE: 62 MMHG | HEART RATE: 70 BPM | BODY MASS INDEX: 30.47 KG/M2 | WEIGHT: 161.4 LBS | HEIGHT: 61 IN | OXYGEN SATURATION: 96 %

## 2025-02-07 DIAGNOSIS — F41.9 ANXIETY: ICD-10-CM

## 2025-02-07 PROCEDURE — 99213 OFFICE O/P EST LOW 20 MIN: CPT | Performed by: NURSE PRACTITIONER

## 2025-02-07 RX ORDER — CITALOPRAM HYDROBROMIDE 20 MG/1
20 TABLET ORAL DAILY
Qty: 90 TABLET | Refills: 3 | Status: SHIPPED | OUTPATIENT
Start: 2025-02-07

## 2025-02-07 RX ORDER — HYDROXYZINE HYDROCHLORIDE 10 MG/1
10 TABLET, FILM COATED ORAL 3 TIMES DAILY PRN
Qty: 270 TABLET | Refills: 3 | Status: SHIPPED | OUTPATIENT
Start: 2025-02-07

## 2025-04-16 DIAGNOSIS — F41.9 ANXIETY: ICD-10-CM

## 2025-04-16 RX ORDER — HYDROXYZINE HYDROCHLORIDE 10 MG/1
10 TABLET, FILM COATED ORAL 3 TIMES DAILY PRN
Qty: 270 TABLET | Refills: 1 | Status: SHIPPED | OUTPATIENT
Start: 2025-04-16

## 2025-04-16 RX ORDER — HYDROXYZINE HYDROCHLORIDE 10 MG/1
10 TABLET, FILM COATED ORAL 3 TIMES DAILY PRN
Qty: 90 TABLET | Refills: 2 | OUTPATIENT
Start: 2025-04-16

## 2025-04-16 RX ORDER — CITALOPRAM HYDROBROMIDE 20 MG/1
20 TABLET ORAL DAILY
Qty: 90 TABLET | Refills: 1 | Status: SHIPPED | OUTPATIENT
Start: 2025-04-16

## 2025-04-16 NOTE — TELEPHONE ENCOUNTER
Caller: Miesha Bro    Relationship: Self    Best call back number: [phone]      Requested Prescriptions:   Requested Prescriptions     Pending Prescriptions Disp Refills    hydrOXYzine (ATARAX) 10 MG tablet 270 tablet 3     Sig: Take 1 tablet by mouth 3 (Three) Times a Day As Needed for Itching.    hydrOXYzine (ATARAX) 10 MG tablet 90 tablet 2     Sig: Take 1 tablet by mouth 3 (Three) Times a Day As Needed for Anxiety.    citalopram (CeleXA) 20 MG tablet 90 tablet 3     Sig: Take 1 tablet by mouth Daily.        Pharmacy where request should be sent: [id number] - [place], [place] - [address] [phone] [facility] [phone] FX     Last office visit with prescribing clinician: 2/7/2025   Last telemedicine visit with prescribing clinician: Visit date not found   Next office visit with prescribing clinician: Visit date not found     Additional details provided by patient: PATIENT STATED THAT SHE IS ON VACATION AND LEFT HER MEDICATION . PATIENT IS REQUESTING A WEEKS SUPPLY OF MEDICATION TO GET THROUGH HER VACATION.    Does the patient have less than a 3 day supply:  [x] Yes  [] No    Would you like a call back once the refill request has been completed: [x] Yes [] No    If the office needs to give you a call back, can they leave a voicemail: [x] Yes [] No    Rosa Ray Rep   04/16/25 08:48 EDT